# Patient Record
Sex: FEMALE | Race: BLACK OR AFRICAN AMERICAN | NOT HISPANIC OR LATINO | Employment: FULL TIME | URBAN - METROPOLITAN AREA
[De-identification: names, ages, dates, MRNs, and addresses within clinical notes are randomized per-mention and may not be internally consistent; named-entity substitution may affect disease eponyms.]

---

## 2015-01-15 LAB
BUN SERPL-MCNC: 22 MG/DL (ref 7–18)
CHOLEST SERPL-MCNC: 218 MG/DL
CREAT SERPL-MCNC: 1.3 MG/DL (ref 0.6–1.3)
GLUCOSE SERPL-MCNC: 105 MG/DL (ref 74–106)
HDLC SERPL-MCNC: 64 MG/DL (ref 35–60)
LDLC SERPL CALC-MCNC: 139 MG/DL
TRIGL SERPL-MCNC: 77 MG/DL (ref 30–150)

## 2017-01-06 ENCOUNTER — HISTORICAL (OUTPATIENT)
Dept: ADMINISTRATIVE | Facility: HOSPITAL | Age: 57
End: 2017-01-06

## 2017-02-10 ENCOUNTER — HISTORICAL (OUTPATIENT)
Dept: RADIOLOGY | Facility: HOSPITAL | Age: 57
End: 2017-02-10

## 2017-03-28 ENCOUNTER — HISTORICAL (OUTPATIENT)
Dept: LAB | Facility: HOSPITAL | Age: 57
End: 2017-03-28

## 2017-08-11 ENCOUNTER — HISTORICAL (OUTPATIENT)
Dept: RADIOLOGY | Facility: HOSPITAL | Age: 57
End: 2017-08-11

## 2017-11-13 ENCOUNTER — HISTORICAL (OUTPATIENT)
Dept: ADMINISTRATIVE | Facility: HOSPITAL | Age: 57
End: 2017-11-13

## 2017-11-13 LAB
ABS NEUT (OLG): 3.09 X10(3)/MCL (ref 2.1–9.2)
ALBUMIN SERPL-MCNC: 4.1 GM/DL (ref 3.4–5)
ALBUMIN/GLOB SERPL: 1.2 {RATIO}
ALP SERPL-CCNC: 112 UNIT/L (ref 38–126)
ALT SERPL-CCNC: 15 UNIT/L (ref 12–78)
AMYLASE SERPL-CCNC: 104 UNIT/L (ref 25–115)
AST SERPL-CCNC: 12 UNIT/L (ref 15–37)
BASOPHILS # BLD AUTO: 0 X10(3)/MCL (ref 0–0.2)
BASOPHILS NFR BLD AUTO: 0 %
BILIRUB SERPL-MCNC: 0.4 MG/DL (ref 0.2–1)
BILIRUBIN DIRECT+TOT PNL SERPL-MCNC: 0.1 MG/DL (ref 0–0.2)
BILIRUBIN DIRECT+TOT PNL SERPL-MCNC: 0.3 MG/DL (ref 0–0.8)
BUN SERPL-MCNC: 18 MG/DL (ref 7–18)
CALCIUM SERPL-MCNC: 9.2 MG/DL (ref 8.5–10.1)
CHLORIDE SERPL-SCNC: 102 MMOL/L (ref 98–107)
CHOLEST SERPL-MCNC: 234 MG/DL (ref 0–200)
CHOLEST/HDLC SERPL: 2.8 {RATIO} (ref 0–4)
CO2 SERPL-SCNC: 31 MMOL/L (ref 21–32)
CREAT SERPL-MCNC: 0.87 MG/DL (ref 0.55–1.02)
EOSINOPHIL # BLD AUTO: 0.2 X10(3)/MCL (ref 0–0.9)
EOSINOPHIL NFR BLD AUTO: 2 %
ERYTHROCYTE [DISTWIDTH] IN BLOOD BY AUTOMATED COUNT: 13.5 % (ref 11.5–17)
ERYTHROCYTE [SEDIMENTATION RATE] IN BLOOD: 13 MM/HR (ref 0–20)
EST. AVERAGE GLUCOSE BLD GHB EST-MCNC: 137 MG/DL
GLOBULIN SER-MCNC: 3.4 GM/DL (ref 2.4–3.5)
GLUCOSE SERPL-MCNC: 96 MG/DL (ref 74–106)
HBA1C MFR BLD: 6.4 % (ref 4.2–6.3)
HCT VFR BLD AUTO: 36.7 % (ref 37–47)
HDLC SERPL-MCNC: 85 MG/DL (ref 35–60)
HGB BLD-MCNC: 11.6 GM/DL (ref 12–16)
LDLC SERPL CALC-MCNC: 139 MG/DL (ref 0–129)
LIPASE SERPL-CCNC: 167 UNIT/L (ref 73–393)
LYMPHOCYTES # BLD AUTO: 3.2 X10(3)/MCL (ref 0.6–4.6)
LYMPHOCYTES NFR BLD AUTO: 45 %
MCH RBC QN AUTO: 29.5 PG (ref 27–31)
MCHC RBC AUTO-ENTMCNC: 31.6 GM/DL (ref 33–36)
MCV RBC AUTO: 93.4 FL (ref 80–94)
MONOCYTES # BLD AUTO: 0.6 X10(3)/MCL (ref 0.1–1.3)
MONOCYTES NFR BLD AUTO: 8 %
NEUTROPHILS # BLD AUTO: 3.09 X10(3)/MCL (ref 1.4–7.9)
NEUTROPHILS NFR BLD AUTO: 44 %
PLATELET # BLD AUTO: 244 X10(3)/MCL (ref 130–400)
PMV BLD AUTO: 9.8 FL (ref 9.4–12.4)
POTASSIUM SERPL-SCNC: 4 MMOL/L (ref 3.5–5.1)
PROT SERPL-MCNC: 7.5 GM/DL (ref 6.4–8.2)
RBC # BLD AUTO: 3.93 X10(6)/MCL (ref 4.2–5.4)
SODIUM SERPL-SCNC: 140 MMOL/L (ref 136–145)
T3FREE SERPL-MCNC: 2.86 PG/ML (ref 2.18–3.98)
T4 FREE SERPL-MCNC: 1.13 NG/DL (ref 0.76–1.46)
TRIGL SERPL-MCNC: 51 MG/DL (ref 30–150)
TSH SERPL-ACNC: 4.9 MIU/ML (ref 0.36–3.74)
VLDLC SERPL CALC-MCNC: 10 MG/DL
WBC # SPEC AUTO: 7.1 X10(3)/MCL (ref 4.5–11.5)

## 2017-11-17 ENCOUNTER — HISTORICAL (OUTPATIENT)
Dept: RADIOLOGY | Facility: HOSPITAL | Age: 57
End: 2017-11-17

## 2017-12-06 ENCOUNTER — HISTORICAL (OUTPATIENT)
Dept: LAB | Facility: HOSPITAL | Age: 57
End: 2017-12-06

## 2017-12-13 ENCOUNTER — HISTORICAL (OUTPATIENT)
Dept: ADMINISTRATIVE | Facility: HOSPITAL | Age: 57
End: 2017-12-13

## 2017-12-13 LAB
APPEARANCE, UA: CLEAR
BACTERIA SPEC CULT: ABNORMAL /HPF
BILIRUB UR QL STRIP: NEGATIVE
COLOR UR: YELLOW
DEPRECATED CALCIDIOL+CALCIFEROL SERPL-MC: 12.14 NG/ML (ref 30–80)
ERYTHROCYTE [SEDIMENTATION RATE] IN BLOOD: 15 MM/HR (ref 0–20)
GLUCOSE (UA): NEGATIVE
HGB UR QL STRIP: NEGATIVE
IRON SATN MFR SERPL: 20.6 % (ref 20–50)
IRON SERPL-MCNC: 74 MCG/DL (ref 50–175)
KETONES UR QL STRIP: NEGATIVE
LEUKOCYTE ESTERASE UR QL STRIP: NEGATIVE
NITRITE UR QL STRIP: NEGATIVE
PH UR STRIP: 5 [PH] (ref 5–9)
PROT UR QL STRIP: NEGATIVE
RBC #/AREA URNS HPF: ABNORMAL /[HPF]
SP GR UR STRIP: 1.01 (ref 1–1.03)
SQUAMOUS EPITHELIAL, UA: ABNORMAL
TIBC SERPL-MCNC: 359 MCG/DL (ref 250–450)
TRANSFERRIN SERPL-MCNC: 273 MG/DL (ref 200–360)
UROBILINOGEN UR STRIP-ACNC: 0.2
WBC #/AREA URNS HPF: ABNORMAL /[HPF]

## 2017-12-15 LAB — FINAL CULTURE: NORMAL

## 2017-12-27 ENCOUNTER — HISTORICAL (OUTPATIENT)
Dept: RADIOLOGY | Facility: HOSPITAL | Age: 57
End: 2017-12-27

## 2017-12-28 ENCOUNTER — HISTORICAL (OUTPATIENT)
Dept: RADIOLOGY | Facility: HOSPITAL | Age: 57
End: 2017-12-28

## 2017-12-29 ENCOUNTER — HISTORICAL (OUTPATIENT)
Dept: ADMINISTRATIVE | Facility: HOSPITAL | Age: 57
End: 2017-12-29

## 2017-12-29 LAB
EST. AVERAGE GLUCOSE BLD GHB EST-MCNC: 134 MG/DL
GLUCOSE P FAST SERPL-MCNC: 89 MG/DL (ref 70–115)
HBA1C MFR BLD: 6.3 % (ref 4.2–6.3)
TSH SERPL-ACNC: 1.49 MIU/ML (ref 0.36–3.74)

## 2018-01-19 LAB
INFLUENZA A ANTIGEN, POC: NEGATIVE
INFLUENZA B ANTIGEN, POC: NEGATIVE

## 2018-02-27 ENCOUNTER — HISTORICAL (OUTPATIENT)
Dept: ADMINISTRATIVE | Facility: HOSPITAL | Age: 58
End: 2018-02-27

## 2018-02-27 LAB
ABS NEUT (OLG): 3.99 X10(3)/MCL (ref 2.1–9.2)
APPEARANCE, UA: NORMAL
BACTERIA SPEC CULT: NORMAL /HPF
BASOPHILS # BLD AUTO: 0 X10(3)/MCL (ref 0–0.2)
BASOPHILS NFR BLD AUTO: 0 %
BILIRUB UR QL STRIP: NEGATIVE
BUN SERPL-MCNC: 17 MG/DL (ref 7–18)
CALCIUM SERPL-MCNC: 9.4 MG/DL (ref 8.5–10.1)
CHLORIDE SERPL-SCNC: 105 MMOL/L (ref 98–107)
CO2 SERPL-SCNC: 31 MMOL/L (ref 21–32)
COLOR UR: YELLOW
CREAT SERPL-MCNC: 1.01 MG/DL (ref 0.55–1.02)
CREAT/UREA NIT SERPL: 16.8
EOSINOPHIL # BLD AUTO: 0.1 X10(3)/MCL (ref 0–0.9)
EOSINOPHIL NFR BLD AUTO: 2 %
ERYTHROCYTE [DISTWIDTH] IN BLOOD BY AUTOMATED COUNT: 13.9 % (ref 11.5–17)
GLUCOSE (UA): NEGATIVE
GLUCOSE SERPL-MCNC: 75 MG/DL (ref 74–106)
HCT VFR BLD AUTO: 35.3 % (ref 37–47)
HGB BLD-MCNC: 11.6 GM/DL (ref 12–16)
HGB UR QL STRIP: NEGATIVE
KETONES UR QL STRIP: NEGATIVE
LEUKOCYTE ESTERASE UR QL STRIP: NEGATIVE
LYMPHOCYTES # BLD AUTO: 3.1 X10(3)/MCL (ref 0.6–4.6)
LYMPHOCYTES NFR BLD AUTO: 39 %
MCH RBC QN AUTO: 29.3 PG (ref 27–31)
MCHC RBC AUTO-ENTMCNC: 32.9 GM/DL (ref 33–36)
MCV RBC AUTO: 89.1 FL (ref 80–94)
MONOCYTES # BLD AUTO: 0.8 X10(3)/MCL (ref 0.1–1.3)
MONOCYTES NFR BLD AUTO: 10 %
NEUTROPHILS # BLD AUTO: 3.99 X10(3)/MCL (ref 2.1–9.2)
NEUTROPHILS NFR BLD AUTO: 50 %
NITRITE UR QL STRIP: NEGATIVE
PH UR STRIP: 7.5 [PH] (ref 5–9)
PLATELET # BLD AUTO: 237 X10(3)/MCL (ref 130–400)
PMV BLD AUTO: 9.5 FL (ref 9.4–12.4)
POTASSIUM SERPL-SCNC: 3.6 MMOL/L (ref 3.5–5.1)
PROT UR QL STRIP: NEGATIVE
RBC # BLD AUTO: 3.96 X10(6)/MCL (ref 4.2–5.4)
RBC #/AREA URNS HPF: NORMAL /[HPF]
SODIUM SERPL-SCNC: 141 MMOL/L (ref 136–145)
SP GR UR STRIP: 1.01 (ref 1–1.03)
SQUAMOUS EPITHELIAL, UA: NORMAL
UA WBC MAN: NORMAL
UROBILINOGEN UR STRIP-ACNC: 0.2
WBC # SPEC AUTO: 8.1 X10(3)/MCL (ref 4.5–11.5)

## 2018-03-14 ENCOUNTER — HISTORICAL (OUTPATIENT)
Dept: ANESTHESIOLOGY | Facility: HOSPITAL | Age: 58
End: 2018-03-14

## 2018-05-02 ENCOUNTER — HISTORICAL (OUTPATIENT)
Dept: ADMINISTRATIVE | Facility: HOSPITAL | Age: 58
End: 2018-05-02

## 2018-05-04 ENCOUNTER — HISTORICAL (OUTPATIENT)
Dept: ADMINISTRATIVE | Facility: HOSPITAL | Age: 58
End: 2018-05-04

## 2018-05-04 LAB
ABS NEUT (OLG): 3.54 X10(3)/MCL (ref 2.1–9.2)
ALBUMIN SERPL-MCNC: 4 GM/DL (ref 3.4–5)
ALBUMIN/GLOB SERPL: 1.2 RATIO (ref 1.1–2)
ALP SERPL-CCNC: 94 UNIT/L (ref 38–126)
ALT SERPL-CCNC: 17 UNIT/L (ref 12–78)
AMYLASE SERPL-CCNC: 107 UNIT/L (ref 25–115)
AST SERPL-CCNC: 11 UNIT/L (ref 15–37)
BASOPHILS # BLD AUTO: 0 X10(3)/MCL (ref 0–0.2)
BASOPHILS NFR BLD AUTO: 0 %
BILIRUB SERPL-MCNC: 0.5 MG/DL (ref 0.2–1)
BILIRUBIN DIRECT+TOT PNL SERPL-MCNC: 0.1 MG/DL (ref 0–0.5)
BILIRUBIN DIRECT+TOT PNL SERPL-MCNC: 0.4 MG/DL (ref 0–0.8)
BUN SERPL-MCNC: 23 MG/DL (ref 7–18)
CALCIUM SERPL-MCNC: 9.7 MG/DL (ref 8.5–10.1)
CHLORIDE SERPL-SCNC: 102 MMOL/L (ref 98–107)
CO2 SERPL-SCNC: 30 MMOL/L (ref 21–32)
CREAT SERPL-MCNC: 0.94 MG/DL (ref 0.55–1.02)
EOSINOPHIL # BLD AUTO: 0.1 X10(3)/MCL (ref 0–0.9)
EOSINOPHIL NFR BLD AUTO: 2 %
ERYTHROCYTE [DISTWIDTH] IN BLOOD BY AUTOMATED COUNT: 14.5 % (ref 11.5–17)
GLOBULIN SER-MCNC: 3.4 GM/DL (ref 2.4–3.5)
GLUCOSE SERPL-MCNC: 92 MG/DL (ref 74–106)
HCT VFR BLD AUTO: 36.2 % (ref 37–47)
HGB BLD-MCNC: 11.8 GM/DL (ref 12–16)
LIPASE SERPL-CCNC: 174 UNIT/L (ref 73–393)
LYMPHOCYTES # BLD AUTO: 4 X10(3)/MCL (ref 0.6–4.6)
LYMPHOCYTES NFR BLD AUTO: 47 %
MCH RBC QN AUTO: 29.9 PG (ref 27–31)
MCHC RBC AUTO-ENTMCNC: 32.6 GM/DL (ref 33–36)
MCV RBC AUTO: 91.9 FL (ref 80–94)
MONOCYTES # BLD AUTO: 0.8 X10(3)/MCL (ref 0.1–1.3)
MONOCYTES NFR BLD AUTO: 10 %
NEUTROPHILS # BLD AUTO: 3.54 X10(3)/MCL (ref 1.4–7.9)
NEUTROPHILS NFR BLD AUTO: 41 %
PLATELET # BLD AUTO: 265 X10(3)/MCL (ref 130–400)
PMV BLD AUTO: 9.7 FL (ref 9.4–12.4)
POTASSIUM SERPL-SCNC: 4 MMOL/L (ref 3.5–5.1)
PROT SERPL-MCNC: 7.4 GM/DL (ref 6.4–8.2)
RBC # BLD AUTO: 3.94 X10(6)/MCL (ref 4.2–5.4)
SODIUM SERPL-SCNC: 138 MMOL/L (ref 136–145)
WBC # SPEC AUTO: 8.6 X10(3)/MCL (ref 4.5–11.5)

## 2018-05-14 ENCOUNTER — HISTORICAL (OUTPATIENT)
Dept: ENDOSCOPY | Facility: HOSPITAL | Age: 58
End: 2018-05-14

## 2018-06-05 ENCOUNTER — HISTORICAL (OUTPATIENT)
Dept: RADIOLOGY | Facility: HOSPITAL | Age: 58
End: 2018-06-05

## 2018-06-05 LAB — POC CREATININE: 1.1 MG/DL (ref 0.6–1.3)

## 2018-08-09 ENCOUNTER — HISTORICAL (OUTPATIENT)
Dept: ADMINISTRATIVE | Facility: HOSPITAL | Age: 58
End: 2018-08-09

## 2018-08-09 LAB
DEPRECATED CALCIDIOL+CALCIFEROL SERPL-MC: 50 NG/ML (ref 30–80)
EST. AVERAGE GLUCOSE BLD GHB EST-MCNC: 134 MG/DL
GLUCOSE P FAST SERPL-MCNC: 96 MG/DL (ref 70–115)
HBA1C MFR BLD: 6.3 % (ref 4.2–6.3)
RHEUMATOID FACT SERPL-ACNC: <10 IU/ML (ref 0–15)
TSH SERPL-ACNC: 2.15 MIU/L (ref 0.36–3.74)

## 2018-08-20 ENCOUNTER — HISTORICAL (OUTPATIENT)
Dept: ADMINISTRATIVE | Facility: HOSPITAL | Age: 58
End: 2018-08-20

## 2018-08-20 LAB
APPEARANCE, UA: CLEAR
BACTERIA SPEC CULT: ABNORMAL /HPF
BILIRUB UR QL STRIP: NEGATIVE
COLOR UR: YELLOW
GLUCOSE (UA): NEGATIVE
HGB UR QL STRIP: NEGATIVE
KETONES UR QL STRIP: NEGATIVE
LEUKOCYTE ESTERASE UR QL STRIP: NEGATIVE
NITRITE UR QL STRIP: NEGATIVE
PH UR STRIP: 5 [PH] (ref 5–9)
PROT UR QL STRIP: NEGATIVE
RBC #/AREA URNS HPF: ABNORMAL /[HPF]
SP GR UR STRIP: 1.03 (ref 1–1.03)
SQUAMOUS EPITHELIAL, UA: ABNORMAL
UROBILINOGEN UR STRIP-ACNC: 1
WBC #/AREA URNS HPF: ABNORMAL /[HPF]

## 2018-08-22 LAB — FINAL CULTURE: NORMAL

## 2018-09-19 LAB
CRC RECOMMENDATION EXT: NORMAL
CRC RECOMMENDATION EXT: NORMAL

## 2018-12-04 ENCOUNTER — HISTORICAL (OUTPATIENT)
Dept: ANESTHESIOLOGY | Facility: HOSPITAL | Age: 58
End: 2018-12-04

## 2018-12-11 ENCOUNTER — HISTORICAL (OUTPATIENT)
Dept: ADMINISTRATIVE | Facility: HOSPITAL | Age: 58
End: 2018-12-11

## 2018-12-11 LAB
ABS NEUT (OLG): 2.61 X10(3)/MCL (ref 2.1–9.2)
ALBUMIN SERPL-MCNC: 3.9 GM/DL (ref 3.4–5)
ALBUMIN/GLOB SERPL: 1.1 RATIO (ref 1.1–2)
ALP SERPL-CCNC: 92 UNIT/L (ref 38–126)
ALT SERPL-CCNC: 16 UNIT/L (ref 12–78)
AMYLASE SERPL-CCNC: 101 UNIT/L (ref 25–115)
AST SERPL-CCNC: 16 UNIT/L (ref 15–37)
BASOPHILS # BLD AUTO: 0 X10(3)/MCL (ref 0–0.2)
BASOPHILS NFR BLD AUTO: 0 %
BILIRUB SERPL-MCNC: 0.5 MG/DL (ref 0.2–1)
BILIRUBIN DIRECT+TOT PNL SERPL-MCNC: 0.1 MG/DL (ref 0–0.5)
BILIRUBIN DIRECT+TOT PNL SERPL-MCNC: 0.4 MG/DL (ref 0–0.8)
BUN SERPL-MCNC: 14 MG/DL (ref 7–18)
CALCIUM SERPL-MCNC: 8.8 MG/DL (ref 8.5–10.1)
CHLORIDE SERPL-SCNC: 106 MMOL/L (ref 98–107)
CO2 SERPL-SCNC: 31 MMOL/L (ref 21–32)
CREAT SERPL-MCNC: 0.93 MG/DL (ref 0.55–1.02)
EOSINOPHIL # BLD AUTO: 0.1 X10(3)/MCL (ref 0–0.9)
EOSINOPHIL NFR BLD AUTO: 2 %
ERYTHROCYTE [DISTWIDTH] IN BLOOD BY AUTOMATED COUNT: 14.8 % (ref 11.5–17)
GLOBULIN SER-MCNC: 3.4 GM/DL (ref 2.4–3.5)
GLUCOSE SERPL-MCNC: 83 MG/DL (ref 74–106)
HCT VFR BLD AUTO: 38 % (ref 37–47)
HGB BLD-MCNC: 11.9 GM/DL (ref 12–16)
LIPASE SERPL-CCNC: 108 UNIT/L (ref 73–393)
LYMPHOCYTES # BLD AUTO: 2.4 X10(3)/MCL (ref 0.6–4.6)
LYMPHOCYTES NFR BLD AUTO: 43 %
MCH RBC QN AUTO: 28.9 PG (ref 27–31)
MCHC RBC AUTO-ENTMCNC: 31.3 GM/DL (ref 33–36)
MCV RBC AUTO: 92.2 FL (ref 80–94)
MONOCYTES # BLD AUTO: 0.5 X10(3)/MCL (ref 0.1–1.3)
MONOCYTES NFR BLD AUTO: 9 %
NEUTROPHILS # BLD AUTO: 2.61 X10(3)/MCL (ref 2.1–9.2)
NEUTROPHILS NFR BLD AUTO: 46 %
PLATELET # BLD AUTO: 253 X10(3)/MCL (ref 130–400)
PMV BLD AUTO: 9.6 FL (ref 9.4–12.4)
POTASSIUM SERPL-SCNC: 4 MMOL/L (ref 3.5–5.1)
PROT SERPL-MCNC: 7.3 GM/DL (ref 6.4–8.2)
RBC # BLD AUTO: 4.12 X10(6)/MCL (ref 4.2–5.4)
SODIUM SERPL-SCNC: 143 MMOL/L (ref 136–145)
WBC # SPEC AUTO: 5.7 X10(3)/MCL (ref 4.5–11.5)

## 2018-12-12 ENCOUNTER — HISTORICAL (OUTPATIENT)
Dept: LAB | Facility: HOSPITAL | Age: 58
End: 2018-12-12

## 2019-04-23 ENCOUNTER — HISTORICAL (OUTPATIENT)
Dept: ADMINISTRATIVE | Facility: HOSPITAL | Age: 59
End: 2019-04-23

## 2019-04-23 LAB
ABS NEUT (OLG): 3.06 X10(3)/MCL (ref 2.1–9.2)
ALBUMIN SERPL-MCNC: 3.8 GM/DL (ref 3.4–5)
ALBUMIN/GLOB SERPL: 1.2 RATIO (ref 1.1–2)
ALP SERPL-CCNC: 86 UNIT/L (ref 38–126)
ALT SERPL-CCNC: 16 UNIT/L (ref 12–78)
APPEARANCE, UA: CLEAR
AST SERPL-CCNC: 13 UNIT/L (ref 15–37)
BACTERIA SPEC CULT: NORMAL /HPF
BASOPHILS # BLD AUTO: 0 X10(3)/MCL (ref 0–0.2)
BASOPHILS NFR BLD AUTO: 0 %
BILIRUB SERPL-MCNC: 0.4 MG/DL (ref 0.2–1)
BILIRUB UR QL STRIP: NEGATIVE
BILIRUBIN DIRECT+TOT PNL SERPL-MCNC: 0.1 MG/DL (ref 0–0.5)
BILIRUBIN DIRECT+TOT PNL SERPL-MCNC: 0.3 MG/DL (ref 0–0.8)
BUN SERPL-MCNC: 16 MG/DL (ref 7–18)
CALCIUM SERPL-MCNC: 8.9 MG/DL (ref 8.5–10.1)
CHLORIDE SERPL-SCNC: 108 MMOL/L (ref 98–107)
CHOLEST SERPL-MCNC: 185 MG/DL (ref 0–200)
CHOLEST/HDLC SERPL: 2.9 {RATIO} (ref 0–4)
CO2 SERPL-SCNC: 27 MMOL/L (ref 21–32)
COLOR UR: YELLOW
CREAT SERPL-MCNC: 1.05 MG/DL (ref 0.55–1.02)
CREAT UR-MCNC: 159 MG/DL
EOSINOPHIL # BLD AUTO: 0.1 X10(3)/MCL (ref 0–0.9)
EOSINOPHIL NFR BLD AUTO: 2 %
ERYTHROCYTE [DISTWIDTH] IN BLOOD BY AUTOMATED COUNT: 14.8 % (ref 11.5–17)
EST. AVERAGE GLUCOSE BLD GHB EST-MCNC: 128 MG/DL
GLOBULIN SER-MCNC: 3.2 GM/DL (ref 2.4–3.5)
GLUCOSE (UA): NEGATIVE
GLUCOSE SERPL-MCNC: 86 MG/DL (ref 74–106)
HBA1C MFR BLD: 6.1 % (ref 4.2–6.3)
HCT VFR BLD AUTO: 33.4 % (ref 37–47)
HDLC SERPL-MCNC: 64 MG/DL (ref 35–60)
HGB BLD-MCNC: 10.6 GM/DL (ref 12–16)
HGB UR QL STRIP: NEGATIVE
KETONES UR QL STRIP: NEGATIVE
LDLC SERPL CALC-MCNC: 109 MG/DL (ref 0–129)
LEUKOCYTE ESTERASE UR QL STRIP: NEGATIVE
LYMPHOCYTES # BLD AUTO: 3.5 X10(3)/MCL (ref 0.6–4.6)
LYMPHOCYTES NFR BLD AUTO: 48 %
MCH RBC QN AUTO: 28.5 PG (ref 27–31)
MCHC RBC AUTO-ENTMCNC: 31.7 GM/DL (ref 33–36)
MCV RBC AUTO: 89.8 FL (ref 80–94)
MICROALBUMIN UR-MCNC: <0.5 MG/DL
MICROALBUMIN/CREAT RATIO PNL UR: <3.1 MG/GM CR (ref 0–30)
MONOCYTES # BLD AUTO: 0.6 X10(3)/MCL (ref 0.1–1.3)
MONOCYTES NFR BLD AUTO: 8 %
NEUTROPHILS # BLD AUTO: 3.06 X10(3)/MCL (ref 2.1–9.2)
NEUTROPHILS NFR BLD AUTO: 42 %
NITRITE UR QL STRIP: NEGATIVE
PH UR STRIP: 5.5 [PH] (ref 5–9)
PLATELET # BLD AUTO: 247 X10(3)/MCL (ref 130–400)
PMV BLD AUTO: 10.5 FL (ref 9.4–12.4)
POTASSIUM SERPL-SCNC: 3.7 MMOL/L (ref 3.5–5.1)
PROT SERPL-MCNC: 7 GM/DL (ref 6.4–8.2)
PROT UR QL STRIP: NEGATIVE
RBC # BLD AUTO: 3.72 X10(6)/MCL (ref 4.2–5.4)
RBC #/AREA URNS HPF: NORMAL /[HPF]
SODIUM SERPL-SCNC: 142 MMOL/L (ref 136–145)
SP GR UR STRIP: 1.02 (ref 1–1.03)
SQUAMOUS EPITHELIAL, UA: NORMAL
TRIGL SERPL-MCNC: 58 MG/DL (ref 30–150)
TSH SERPL-ACNC: 2.99 MIU/L (ref 0.36–3.74)
UROBILINOGEN UR STRIP-ACNC: 0.2
VLDLC SERPL CALC-MCNC: 12 MG/DL
WBC # SPEC AUTO: 7.4 X10(3)/MCL (ref 4.5–11.5)
WBC #/AREA URNS HPF: NORMAL /[HPF]

## 2019-05-16 ENCOUNTER — HISTORICAL (OUTPATIENT)
Dept: ADMINISTRATIVE | Facility: HOSPITAL | Age: 59
End: 2019-05-16

## 2019-05-16 LAB
DEPRECATED CALCIDIOL+CALCIFEROL SERPL-MC: 24.32 NG/ML (ref 30–80)
IRON SATN MFR SERPL: 19.8 % (ref 20–50)
IRON SERPL-MCNC: 64 MCG/DL (ref 50–175)
TIBC SERPL-MCNC: 324 MCG/DL (ref 250–450)
TRANSFERRIN SERPL-MCNC: 250 MG/DL (ref 200–360)
VIT B12 SERPL-MCNC: 644 PG/ML (ref 193–986)

## 2019-07-24 ENCOUNTER — HISTORICAL (OUTPATIENT)
Dept: ADMINISTRATIVE | Facility: HOSPITAL | Age: 59
End: 2019-07-24

## 2019-07-24 LAB
T4 FREE SERPL-MCNC: 0.82 NG/DL (ref 0.76–1.46)
TSH SERPL-ACNC: 4.99 MIU/L (ref 0.36–3.74)

## 2020-01-13 ENCOUNTER — HISTORICAL (OUTPATIENT)
Dept: ADMINISTRATIVE | Facility: HOSPITAL | Age: 60
End: 2020-01-13

## 2020-01-13 LAB
ABS NEUT (OLG): 2.08 X10(3)/MCL (ref 2.1–9.2)
ALBUMIN SERPL-MCNC: 4 GM/DL (ref 3.4–5)
ALBUMIN/GLOB SERPL: 1.2 {RATIO}
ALP SERPL-CCNC: 87 UNIT/L (ref 38–126)
ALT SERPL-CCNC: 17 UNIT/L (ref 12–78)
APPEARANCE, UA: CLEAR
AST SERPL-CCNC: 14 UNIT/L (ref 15–37)
BACTERIA SPEC CULT: NORMAL /HPF
BASOPHILS # BLD AUTO: 0 X10(3)/MCL (ref 0–0.2)
BASOPHILS NFR BLD AUTO: 0 %
BILIRUB SERPL-MCNC: 0.5 MG/DL (ref 0.2–1)
BILIRUB UR QL STRIP: NEGATIVE
BILIRUBIN DIRECT+TOT PNL SERPL-MCNC: 0.1 MG/DL (ref 0–0.2)
BILIRUBIN DIRECT+TOT PNL SERPL-MCNC: 0.4 MG/DL (ref 0–0.8)
BUN SERPL-MCNC: 15 MG/DL (ref 7–18)
CALCIUM SERPL-MCNC: 9.3 MG/DL (ref 8.5–10.1)
CHLORIDE SERPL-SCNC: 103 MMOL/L (ref 98–107)
CHOLEST SERPL-MCNC: 208 MG/DL (ref 0–200)
CHOLEST/HDLC SERPL: 2.5 {RATIO} (ref 0–4)
CO2 SERPL-SCNC: 32 MMOL/L (ref 21–32)
COLOR UR: YELLOW
CREAT SERPL-MCNC: 1.02 MG/DL (ref 0.55–1.02)
EOSINOPHIL # BLD AUTO: 0.1 X10(3)/MCL (ref 0–0.9)
EOSINOPHIL NFR BLD AUTO: 2 %
ERYTHROCYTE [DISTWIDTH] IN BLOOD BY AUTOMATED COUNT: 14.8 % (ref 11.5–17)
EST. AVERAGE GLUCOSE BLD GHB EST-MCNC: 131 MG/DL
GLOBULIN SER-MCNC: 3.2 GM/DL (ref 2.4–3.5)
GLUCOSE (UA): NEGATIVE
GLUCOSE SERPL-MCNC: 97 MG/DL (ref 74–106)
HBA1C MFR BLD: 6.2 % (ref 4.2–6.3)
HCT VFR BLD AUTO: 37.7 % (ref 37–47)
HDLC SERPL-MCNC: 83 MG/DL (ref 35–60)
HGB BLD-MCNC: 11.6 GM/DL (ref 12–16)
HGB UR QL STRIP: NEGATIVE
KETONES UR QL STRIP: NEGATIVE
LDLC SERPL CALC-MCNC: 116 MG/DL (ref 0–129)
LEUKOCYTE ESTERASE UR QL STRIP: NEGATIVE
LYMPHOCYTES # BLD AUTO: 2.9 X10(3)/MCL (ref 0.6–4.6)
LYMPHOCYTES NFR BLD AUTO: 52 %
MCH RBC QN AUTO: 28.6 PG (ref 27–31)
MCHC RBC AUTO-ENTMCNC: 30.8 GM/DL (ref 33–36)
MCV RBC AUTO: 93.1 FL (ref 80–94)
MONOCYTES # BLD AUTO: 0.5 X10(3)/MCL (ref 0.1–1.3)
MONOCYTES NFR BLD AUTO: 9 %
NEUTROPHILS # BLD AUTO: 2.08 X10(3)/MCL (ref 2.1–9.2)
NEUTROPHILS NFR BLD AUTO: 37 %
NITRITE UR QL STRIP: NEGATIVE
PH UR STRIP: 6.5 [PH] (ref 5–9)
PLATELET # BLD AUTO: 232 X10(3)/MCL (ref 130–400)
PMV BLD AUTO: 9.6 FL (ref 9.4–12.4)
POTASSIUM SERPL-SCNC: 4.1 MMOL/L (ref 3.5–5.1)
PROT SERPL-MCNC: 7.2 GM/DL (ref 6.4–8.2)
PROT UR QL STRIP: NEGATIVE
RBC # BLD AUTO: 4.05 X10(6)/MCL (ref 4.2–5.4)
RBC #/AREA URNS HPF: NORMAL /[HPF]
SODIUM SERPL-SCNC: 139 MMOL/L (ref 136–145)
SP GR UR STRIP: 1.01 (ref 1–1.03)
SQUAMOUS EPITHELIAL, UA: NORMAL
TRIGL SERPL-MCNC: 45 MG/DL (ref 30–150)
TSH SERPL-ACNC: 2.98 MIU/L (ref 0.36–3.74)
UROBILINOGEN UR STRIP-ACNC: 0.2
VLDLC SERPL CALC-MCNC: 9 MG/DL
WBC # SPEC AUTO: 5.7 X10(3)/MCL (ref 4.5–11.5)
WBC #/AREA URNS HPF: NORMAL /[HPF]

## 2020-05-07 ENCOUNTER — HISTORICAL (OUTPATIENT)
Dept: LAB | Facility: HOSPITAL | Age: 60
End: 2020-05-07

## 2020-08-15 ENCOUNTER — HISTORICAL (OUTPATIENT)
Dept: LAB | Facility: HOSPITAL | Age: 60
End: 2020-08-15

## 2020-08-15 LAB — SARS-COV-2 RNA RESP QL NAA+PROBE: NOT DETECTED

## 2020-08-31 ENCOUNTER — HISTORICAL (OUTPATIENT)
Dept: LAB | Facility: HOSPITAL | Age: 60
End: 2020-08-31

## 2020-08-31 LAB
ABS NEUT (OLG): 2.82 X10(3)/MCL (ref 2.1–9.2)
ALBUMIN SERPL-MCNC: 3.8 GM/DL (ref 3.4–5)
ALBUMIN/GLOB SERPL: 1 RATIO (ref 1.1–2)
ALP SERPL-CCNC: 101 UNIT/L (ref 45–117)
ALT SERPL-CCNC: 15 UNIT/L (ref 12–78)
AST SERPL-CCNC: 20 UNIT/L (ref 15–37)
BASOPHILS NFR BLD MANUAL: 0 %
BILIRUB SERPL-MCNC: 0.2 MG/DL (ref 0.2–1)
BILIRUBIN DIRECT+TOT PNL SERPL-MCNC: <0.1 MG/DL (ref 0–0.2)
BILIRUBIN DIRECT+TOT PNL SERPL-MCNC: ABNORMAL MG/DL
BUN SERPL-MCNC: 20 MG/DL (ref 7–18)
CALCIUM SERPL-MCNC: 9.2 MG/DL (ref 8.5–10.1)
CHLORIDE SERPL-SCNC: 107 MMOL/L (ref 98–107)
CHOLEST SERPL-MCNC: 204 MG/DL
CHOLEST/HDLC SERPL: 3 {RATIO} (ref 0–4.4)
CO2 SERPL-SCNC: 28 MMOL/L (ref 21–32)
CREAT SERPL-MCNC: 1 MG/DL (ref 0.6–1.3)
EOSINOPHIL NFR BLD MANUAL: 1 %
ERYTHROCYTE [DISTWIDTH] IN BLOOD BY AUTOMATED COUNT: 13.8 % (ref 11.5–14.5)
EST. AVERAGE GLUCOSE BLD GHB EST-MCNC: 140 MG/DL
GLOBULIN SER-MCNC: 3.8 GM/ML (ref 2.3–3.5)
GLUCOSE SERPL-MCNC: 92 MG/DL (ref 74–106)
GRANULOCYTES NFR BLD MANUAL: 34 % (ref 43–75)
HBA1C MFR BLD: 6.5 % (ref 4.2–6.3)
HCT VFR BLD AUTO: 35.1 % (ref 35–46)
HDLC SERPL-MCNC: 69 MG/DL (ref 40–59)
HGB BLD-MCNC: 11.3 GM/DL (ref 12–16)
LDLC SERPL CALC-MCNC: 122 MG/DL
LYMPHOCYTES NFR BLD MANUAL: 59 % (ref 20.5–51.1)
MCH RBC QN AUTO: 30.3 PG (ref 26–34)
MCHC RBC AUTO-ENTMCNC: 32.2 GM/DL (ref 31–37)
MCV RBC AUTO: 94.1 FL (ref 80–100)
MONOCYTES NFR BLD MANUAL: 5 % (ref 2–9)
NEUTS BAND NFR BLD MANUAL: 1 % (ref 0–10)
PLATELET # BLD AUTO: 215 X10(3)/MCL (ref 130–400)
PLATELET # BLD EST: ADEQUATE 10*3/UL
PMV BLD AUTO: 9.2 FL (ref 7.4–10.4)
POTASSIUM SERPL-SCNC: 3.6 MMOL/L (ref 3.5–5.1)
PROT SERPL-MCNC: 7.6 GM/DL (ref 6.4–8.2)
RBC # BLD AUTO: 3.73 X10(6)/MCL (ref 4–5.2)
RBC MORPH BLD: NORMAL
SODIUM SERPL-SCNC: 141 MMOL/L (ref 136–145)
TRIGL SERPL-MCNC: 63 MG/DL
TSH SERPL-ACNC: 3.57 MIU/L (ref 0.36–3.74)
VLDLC SERPL CALC-MCNC: 13 MG/DL
WBC # SPEC AUTO: 8.4 X10(3)/MCL (ref 4.5–11)

## 2020-12-14 ENCOUNTER — HISTORICAL (OUTPATIENT)
Dept: RADIOLOGY | Facility: HOSPITAL | Age: 60
End: 2020-12-14

## 2020-12-16 ENCOUNTER — HISTORICAL (OUTPATIENT)
Dept: LAB | Facility: HOSPITAL | Age: 60
End: 2020-12-16

## 2020-12-16 LAB
ABS NEUT (OLG): 2.97 X10(3)/MCL (ref 2.1–9.2)
BASOPHILS # BLD AUTO: 0.01 X10(3)/MCL (ref 0–0.2)
BASOPHILS NFR BLD AUTO: 0.1 % (ref 0–1)
BUN SERPL-MCNC: 19.1 MG/DL (ref 9.8–20.1)
CALCIUM SERPL-MCNC: 9.7 MG/DL (ref 8.4–10.2)
CHLORIDE SERPL-SCNC: 105 MMOL/L (ref 98–107)
CHOLEST SERPL-MCNC: 203 MG/DL
CHOLEST/HDLC SERPL: 3 {RATIO} (ref 0–5)
CO2 SERPL-SCNC: 29 MMOL/L (ref 22–29)
CREAT SERPL-MCNC: 0.96 MG/DL (ref 0.57–1.11)
CREAT/UREA NIT SERPL: 20
EOSINOPHIL # BLD AUTO: 0.14 X10(3)/MCL (ref 0–0.9)
EOSINOPHIL NFR BLD AUTO: 1.8 % (ref 0–6.4)
ERYTHROCYTE [DISTWIDTH] IN BLOOD BY AUTOMATED COUNT: 14.5 % (ref 11.5–17)
EST. AVERAGE GLUCOSE BLD GHB EST-MCNC: 119.8 MG/DL
GLUCOSE SERPL-MCNC: 102 MG/DL (ref 74–100)
HBA1C MFR BLD: 5.8 %
HCT VFR BLD AUTO: 34.2 % (ref 37–47)
HDLC SERPL-MCNC: 66 MG/DL (ref 40–60)
HGB BLD-MCNC: 11 GM/DL (ref 12–16)
IMM GRANULOCYTES # BLD AUTO: 0.01 10*3/UL (ref 0–0.02)
IMM GRANULOCYTES NFR BLD AUTO: 0.1 % (ref 0–0.43)
LDLC SERPL CALC-MCNC: 127 MG/DL (ref 50–140)
LYMPHOCYTES # BLD AUTO: 3.92 X10(3)/MCL (ref 0.6–4.6)
LYMPHOCYTES NFR BLD AUTO: 51.2 % (ref 16–44)
MCH RBC QN AUTO: 30 PG (ref 27–31)
MCHC RBC AUTO-ENTMCNC: 32.2 GM/DL (ref 33–36)
MCV RBC AUTO: 93.2 FL (ref 80–94)
MONOCYTES # BLD AUTO: 0.61 X10(3)/MCL (ref 0.1–1.3)
MONOCYTES NFR BLD AUTO: 8 % (ref 4–12.1)
NEUTROPHILS # BLD AUTO: 2.97 X10(3)/MCL (ref 2.1–9.2)
NEUTROPHILS NFR BLD AUTO: 38.8 % (ref 43–73)
NRBC BLD AUTO-RTO: 0 % (ref 0–0.2)
PLATELET # BLD AUTO: 218 X10(3)/MCL (ref 130–400)
PMV BLD AUTO: 9.8 FL (ref 7.4–10.4)
POTASSIUM SERPL-SCNC: 3.8 MMOL/L (ref 3.5–5.1)
RBC # BLD AUTO: 3.67 X10(6)/MCL (ref 4.2–5.4)
SODIUM SERPL-SCNC: 143 MMOL/L (ref 136–145)
TRIGL SERPL-MCNC: 48 MG/DL (ref 0–150)
VLDLC SERPL CALC-MCNC: 10 MG/DL
WBC # SPEC AUTO: 7.7 X10(3)/MCL (ref 4.5–11.5)

## 2021-03-09 ENCOUNTER — HISTORICAL (OUTPATIENT)
Dept: LAB | Facility: HOSPITAL | Age: 61
End: 2021-03-09

## 2021-03-09 LAB — SARS-COV-2 RNA RESP QL NAA+PROBE: DETECTED

## 2021-03-11 ENCOUNTER — HISTORICAL (OUTPATIENT)
Dept: INFECTIOUS DISEASES | Facility: HOSPITAL | Age: 61
End: 2021-03-11

## 2021-03-30 ENCOUNTER — HISTORICAL (OUTPATIENT)
Dept: ADMINISTRATIVE | Facility: HOSPITAL | Age: 61
End: 2021-03-30

## 2021-03-30 LAB
ABS NEUT (OLG): 2.11 X10(3)/MCL (ref 2.1–9.2)
ALBUMIN SERPL-MCNC: 3.7 GM/DL (ref 3.5–5)
ALBUMIN/GLOB SERPL: 1.4 RATIO (ref 1.1–2)
ALP SERPL-CCNC: 80 UNIT/L (ref 40–150)
ALT SERPL-CCNC: 9 UNIT/L (ref 0–55)
APPEARANCE, UA: CLEAR
AST SERPL-CCNC: 16 UNIT/L (ref 5–34)
BACTERIA SPEC CULT: ABNORMAL /HPF
BASOPHILS # BLD AUTO: 0 X10(3)/MCL (ref 0–0.2)
BASOPHILS NFR BLD AUTO: 0 %
BILIRUB SERPL-MCNC: 0.5 MG/DL
BILIRUB UR QL STRIP: NEGATIVE
BILIRUBIN DIRECT+TOT PNL SERPL-MCNC: 0.2 MG/DL (ref 0–0.5)
BILIRUBIN DIRECT+TOT PNL SERPL-MCNC: 0.3 MG/DL (ref 0–0.8)
BUN SERPL-MCNC: 20.9 MG/DL (ref 9.8–20.1)
CALCIUM SERPL-MCNC: 8.9 MG/DL (ref 8.4–10.2)
CHLORIDE SERPL-SCNC: 107 MMOL/L (ref 98–107)
CHOLEST SERPL-MCNC: 201 MG/DL
CHOLEST/HDLC SERPL: 3 {RATIO} (ref 0–5)
CO2 SERPL-SCNC: 29 MMOL/L (ref 22–29)
COLOR UR: YELLOW
CREAT SERPL-MCNC: 0.97 MG/DL (ref 0.55–1.02)
EOSINOPHIL # BLD AUTO: 0 X10(3)/MCL (ref 0–0.9)
EOSINOPHIL NFR BLD AUTO: 1 %
ERYTHROCYTE [DISTWIDTH] IN BLOOD BY AUTOMATED COUNT: 14.7 % (ref 11.5–17)
EST. AVERAGE GLUCOSE BLD GHB EST-MCNC: 122.6 MG/DL
FERRITIN SERPL-MCNC: 83.07 NG/ML (ref 4.63–204)
GLOBULIN SER-MCNC: 2.6 GM/DL (ref 2.4–3.5)
GLUCOSE (UA): NEGATIVE
GLUCOSE SERPL-MCNC: 88 MG/DL (ref 74–100)
HBA1C MFR BLD: 5.9 %
HCT VFR BLD AUTO: 33.3 % (ref 37–47)
HDLC SERPL-MCNC: 68 MG/DL (ref 35–60)
HGB BLD-MCNC: 10.7 GM/DL (ref 12–16)
HGB UR QL STRIP: NEGATIVE
IRON SATN MFR SERPL: 30 % (ref 20–50)
IRON SERPL-MCNC: 81 UG/DL (ref 50–170)
KETONES UR QL STRIP: ABNORMAL
LDLC SERPL CALC-MCNC: 122 MG/DL (ref 50–140)
LEUKOCYTE ESTERASE UR QL STRIP: ABNORMAL
LYMPHOCYTES # BLD AUTO: 2.1 X10(3)/MCL (ref 0.6–4.6)
LYMPHOCYTES NFR BLD AUTO: 44 %
MCH RBC QN AUTO: 29.7 PG (ref 27–31)
MCHC RBC AUTO-ENTMCNC: 32.1 GM/DL (ref 33–36)
MCV RBC AUTO: 92.5 FL (ref 80–94)
MONOCYTES # BLD AUTO: 0.5 X10(3)/MCL (ref 0.1–1.3)
MONOCYTES NFR BLD AUTO: 10 %
NEUTROPHILS # BLD AUTO: 2.11 X10(3)/MCL (ref 2.1–9.2)
NEUTROPHILS NFR BLD AUTO: 45 %
NITRITE UR QL STRIP: NEGATIVE
PH UR STRIP: 5 [PH] (ref 5–9)
PLATELET # BLD AUTO: 264 X10(3)/MCL (ref 130–400)
PMV BLD AUTO: 10.7 FL (ref 9.4–12.4)
POTASSIUM SERPL-SCNC: 4.4 MMOL/L (ref 3.5–5.1)
PROT SERPL-MCNC: 6.3 GM/DL (ref 6.4–8.3)
PROT UR QL STRIP: NEGATIVE
RBC # BLD AUTO: 3.6 X10(6)/MCL (ref 4.2–5.4)
RBC #/AREA URNS HPF: ABNORMAL /[HPF]
SODIUM SERPL-SCNC: 142 MMOL/L (ref 136–145)
SP GR UR STRIP: 1.02 (ref 1–1.03)
SQUAMOUS EPITHELIAL, UA: ABNORMAL /HPF (ref 0–4)
TIBC SERPL-MCNC: 187 UG/DL (ref 70–310)
TIBC SERPL-MCNC: 268 UG/DL (ref 250–450)
TRANSFERRIN SERPL-MCNC: 231 MG/DL (ref 180–382)
TRIGL SERPL-MCNC: 53 MG/DL (ref 37–140)
TSH SERPL-ACNC: 1.89 UIU/ML (ref 0.35–4.94)
UROBILINOGEN UR STRIP-ACNC: 0.2
VIT B12 SERPL-MCNC: >2000 PG/ML (ref 213–816)
VLDLC SERPL CALC-MCNC: 11 MG/DL
WBC # SPEC AUTO: 4.7 X10(3)/MCL (ref 4.5–11.5)
WBC #/AREA URNS HPF: 5 /HPF (ref 0–3)

## 2021-10-05 ENCOUNTER — HISTORICAL (OUTPATIENT)
Dept: LAB | Facility: HOSPITAL | Age: 61
End: 2021-10-05

## 2021-10-05 LAB
ABS NEUT (OLG): 3.45 X10(3)/MCL (ref 2.1–9.2)
ALBUMIN SERPL-MCNC: 4.5 GM/DL (ref 3.5–5)
ALBUMIN/GLOB SERPL: 1.3 RATIO (ref 1.1–2)
ALP SERPL-CCNC: 84 UNIT/L (ref 40–150)
ALT SERPL-CCNC: 11 UNIT/L (ref 0–55)
AST SERPL-CCNC: 20 UNIT/L (ref 5–34)
BASOPHILS # BLD AUTO: 0.03 X10(3)/MCL (ref 0–0.2)
BASOPHILS NFR BLD AUTO: 0.4 % (ref 0–1)
BILIRUB SERPL-MCNC: 0.4 MG/DL (ref 0.2–1.2)
BILIRUBIN DIRECT+TOT PNL SERPL-MCNC: 0.2 MG/DL (ref 0–0.5)
BILIRUBIN DIRECT+TOT PNL SERPL-MCNC: 0.2 MG/DL (ref 0–0.8)
BUN SERPL-MCNC: 19.6 MG/DL (ref 9.8–20.1)
CALCIUM SERPL-MCNC: 10 MG/DL (ref 8.4–10.2)
CHLORIDE SERPL-SCNC: 101 MMOL/L (ref 98–107)
CHOLEST SERPL-MCNC: 225 MG/DL
CHOLEST/HDLC SERPL: 3 {RATIO} (ref 0–5)
CO2 SERPL-SCNC: 30 MMOL/L (ref 22–29)
CREAT SERPL-MCNC: 1.05 MG/DL (ref 0.57–1.11)
EOSINOPHIL # BLD AUTO: 0.21 X10(3)/MCL (ref 0–0.9)
EOSINOPHIL NFR BLD AUTO: 2.7 % (ref 0–6.4)
ERYTHROCYTE [DISTWIDTH] IN BLOOD BY AUTOMATED COUNT: 14.4 % (ref 11.5–17)
EST. AVERAGE GLUCOSE BLD GHB EST-MCNC: 116.9 MG/DL
FERRITIN SERPL-MCNC: 79.37 NG/ML (ref 4.63–204)
GLOBULIN SER-MCNC: 3.4 GM/DL (ref 2.4–3.5)
GLUCOSE SERPL-MCNC: 91 MG/DL (ref 74–100)
HBA1C MFR BLD: 5.7 %
HCT VFR BLD AUTO: 37.5 % (ref 37–47)
HDLC SERPL-MCNC: 75 MG/DL (ref 40–60)
HGB BLD-MCNC: 12.2 GM/DL (ref 12–16)
IMM GRANULOCYTES # BLD AUTO: 0.01 10*3/UL (ref 0–0.02)
IMM GRANULOCYTES NFR BLD AUTO: 0.1 % (ref 0–0.43)
LDLC SERPL CALC-MCNC: 136 MG/DL (ref 50–140)
LYMPHOCYTES # BLD AUTO: 3.35 X10(3)/MCL (ref 0.6–4.6)
LYMPHOCYTES NFR BLD AUTO: 43.5 % (ref 16–44)
MCH RBC QN AUTO: 30 PG (ref 27–31)
MCHC RBC AUTO-ENTMCNC: 32.5 GM/DL (ref 33–36)
MCV RBC AUTO: 92.4 FL (ref 80–94)
MONOCYTES # BLD AUTO: 0.65 X10(3)/MCL (ref 0.1–1.3)
MONOCYTES NFR BLD AUTO: 8.4 % (ref 4–12.1)
NEUTROPHILS # BLD AUTO: 3.45 X10(3)/MCL (ref 2.1–9.2)
NEUTROPHILS NFR BLD AUTO: 44.9 % (ref 43–73)
NRBC BLD AUTO-RTO: 0 % (ref 0–0.2)
PLATELET # BLD AUTO: 243 X10(3)/MCL (ref 130–400)
PMV BLD AUTO: 9.5 FL (ref 7.4–10.4)
POTASSIUM SERPL-SCNC: 3.7 MMOL/L (ref 3.5–5.1)
PROT SERPL-MCNC: 7.9 GM/DL (ref 6.4–8.3)
RBC # BLD AUTO: 4.06 X10(6)/MCL (ref 4.2–5.4)
SODIUM SERPL-SCNC: 140 MMOL/L (ref 136–145)
TRIGL SERPL-MCNC: 69 MG/DL (ref 0–150)
TSH SERPL-ACNC: 1.92 UIU/ML (ref 0.35–4.94)
VLDLC SERPL CALC-MCNC: 14 MG/DL
WBC # SPEC AUTO: 7.7 X10(3)/MCL (ref 4.5–11.5)

## 2021-10-06 LAB
APPEARANCE, UA: CLEAR
BILIRUB UR QL STRIP: NEGATIVE
COLOR UR: YELLOW
CREAT UR-MCNC: 44.5 MG/DL (ref 45–106)
GLUCOSE (UA): NEGATIVE
HGB UR QL STRIP: NEGATIVE
KETONES UR QL STRIP: NEGATIVE
LEUKOCYTE ESTERASE UR QL STRIP: NEGATIVE
MICROALBUMIN UR-MCNC: <5 UG/ML
MICROALBUMIN/CREAT RATIO PNL UR: <11.2 MG/GM CR (ref 0–30)
NITRITE UR QL STRIP: NEGATIVE
PH UR STRIP: 6.5 [PH] (ref 5–7)
PROT UR QL STRIP: NEGATIVE
SP GR UR STRIP: 1.01 (ref 1–1.03)
UROBILINOGEN UR STRIP-ACNC: NEGATIVE

## 2021-10-27 LAB
PAP RECOMMENDATION EXT: NORMAL
PAP SMEAR: NORMAL

## 2022-04-10 ENCOUNTER — HISTORICAL (OUTPATIENT)
Dept: ADMINISTRATIVE | Facility: HOSPITAL | Age: 62
End: 2022-04-10
Payer: COMMERCIAL

## 2022-04-13 ENCOUNTER — HISTORICAL (OUTPATIENT)
Dept: RADIOLOGY | Facility: HOSPITAL | Age: 62
End: 2022-04-13
Payer: COMMERCIAL

## 2022-04-13 ENCOUNTER — HISTORICAL (OUTPATIENT)
Dept: ADMINISTRATIVE | Facility: HOSPITAL | Age: 62
End: 2022-04-13

## 2022-04-19 ENCOUNTER — HISTORICAL (OUTPATIENT)
Dept: ADMINISTRATIVE | Facility: HOSPITAL | Age: 62
End: 2022-04-19
Payer: COMMERCIAL

## 2022-04-19 LAB
ABS NEUT (OLG): 2.41 (ref 2.1–9.2)
ALBUMIN SERPL-MCNC: 4.1 G/DL (ref 3.4–4.8)
ALBUMIN/GLOB SERPL: 1.4 {RATIO} (ref 1.1–2)
ALP SERPL-CCNC: 70 U/L (ref 40–150)
ALT SERPL-CCNC: 17 U/L (ref 0–55)
APPEARANCE, UA: CLEAR
AST SERPL-CCNC: 24 U/L (ref 5–34)
BACTERIA SPEC CULT: NORMAL
BASOPHILS # BLD AUTO: 0 10*3/UL (ref 0–0.2)
BASOPHILS NFR BLD AUTO: 0 %
BILIRUB SERPL-MCNC: 0.5 MG/DL
BILIRUB UR QL STRIP: NEGATIVE
BILIRUBIN DIRECT+TOT PNL SERPL-MCNC: 0.2 (ref 0–0.5)
BILIRUBIN DIRECT+TOT PNL SERPL-MCNC: 0.3 (ref 0–0.8)
BUN SERPL-MCNC: 24.1 MG/DL (ref 9.8–20.1)
CALCIUM SERPL-MCNC: 9.5 MG/DL (ref 8.7–10.5)
CHLORIDE SERPL-SCNC: 103 MMOL/L (ref 98–107)
CHOLEST SERPL-MCNC: 235 MG/DL
CHOLEST/HDLC SERPL: 3 {RATIO} (ref 0–5)
CO2 SERPL-SCNC: 31 MMOL/L (ref 23–31)
COLOR UR: YELLOW
CREAT SERPL-MCNC: 1.06 MG/DL (ref 0.55–1.02)
EOSINOPHIL # BLD AUTO: 0.2 10*3/UL (ref 0–0.9)
EOSINOPHIL NFR BLD AUTO: 2 %
ERYTHROCYTE [DISTWIDTH] IN BLOOD BY AUTOMATED COUNT: 14.6 % (ref 11.5–17)
EST. AVERAGE GLUCOSE BLD GHB EST-MCNC: 122.6 MG/DL
FERRITIN SERPL-MCNC: 60.47 NG/ML (ref 4.63–204)
GLOBULIN SER-MCNC: 2.9 G/DL (ref 2.4–3.5)
GLUCOSE (UA): NEGATIVE
GLUCOSE SERPL-MCNC: 88 MG/DL (ref 82–115)
HBA1C MFR BLD: 5.9 %
HCT VFR BLD AUTO: 36.1 % (ref 37–47)
HDLC SERPL-MCNC: 74 MG/DL (ref 35–60)
HEMOLYSIS INTERF INDEX SERPL-ACNC: 0
HGB BLD-MCNC: 11.4 G/DL (ref 12–16)
HGB UR QL STRIP: NEGATIVE
ICTERIC INTERF INDEX SERPL-ACNC: 1
KETONES UR QL STRIP: NEGATIVE
LDLC SERPL CALC-MCNC: 151 MG/DL (ref 50–140)
LEUKOCYTE ESTERASE UR QL STRIP: NEGATIVE
LIPEMIC INTERF INDEX SERPL-ACNC: 2
LYMPHOCYTES # BLD AUTO: 3 10*3/UL (ref 0.6–4.6)
LYMPHOCYTES NFR BLD AUTO: 48 %
MANUAL DIFF? (OHS): NO
MCH RBC QN AUTO: 30.2 PG (ref 27–31)
MCHC RBC AUTO-ENTMCNC: 31.6 G/DL (ref 33–36)
MCV RBC AUTO: 95.5 FL (ref 80–94)
MONOCYTES # BLD AUTO: 0.6 10*3/UL (ref 0.1–1.3)
MONOCYTES NFR BLD AUTO: 10 %
NEUTROPHILS # BLD AUTO: 2.41 10*3/UL (ref 2.1–9.2)
NEUTROPHILS NFR BLD AUTO: 39 %
NITRITE UR QL STRIP: NEGATIVE
PH UR STRIP: 6 [PH] (ref 5–9)
PLATELET # BLD AUTO: 224 10*3/UL (ref 130–400)
PMV BLD AUTO: 11.8 FL (ref 9.4–12.4)
POTASSIUM SERPL-SCNC: 4 MMOL/L (ref 3.5–5.1)
PROT SERPL-MCNC: 7 G/DL (ref 5.8–7.6)
PROT UR QL STRIP: NEGATIVE
RBC # BLD AUTO: 3.78 10*6/UL (ref 4.2–5.4)
RBC #/AREA URNS HPF: NORMAL /[HPF] (ref 0–2)
SODIUM SERPL-SCNC: 142 MMOL/L (ref 136–145)
SP GR UR STRIP: 1.02 (ref 1–1.03)
SQUAMOUS EPITHELIAL, UA: NORMAL (ref 0–4)
TRIGL SERPL-MCNC: 49 MG/DL (ref 37–140)
TSH SERPL-ACNC: 1.84 M[IU]/L (ref 0.35–4.94)
UA WBC MAN: NORMAL (ref 0–2)
UROBILINOGEN UR STRIP-ACNC: 0.2
VIT B12 SERPL-MCNC: >2000 PG/ML (ref 213–816)
VLDLC SERPL CALC-MCNC: 10 MG/DL
WBC # SPEC AUTO: 6.2 10*3/UL (ref 4.5–11.5)

## 2022-04-26 DIAGNOSIS — Z00.00 GENERAL MEDICAL EXAM: ICD-10-CM

## 2022-04-26 DIAGNOSIS — Z00.00 ENCOUNTER FOR GENERAL ADULT MEDICAL EXAMINATION WITHOUT ABNORMAL FINDINGS: Primary | ICD-10-CM

## 2022-04-27 ENCOUNTER — HISTORICAL (OUTPATIENT)
Dept: RADIOLOGY | Facility: HOSPITAL | Age: 62
End: 2022-04-27
Payer: COMMERCIAL

## 2022-04-27 ENCOUNTER — HISTORICAL (OUTPATIENT)
Dept: ADMINISTRATIVE | Facility: HOSPITAL | Age: 62
End: 2022-04-27
Payer: COMMERCIAL

## 2022-04-27 VITALS
SYSTOLIC BLOOD PRESSURE: 126 MMHG | OXYGEN SATURATION: 99 % | DIASTOLIC BLOOD PRESSURE: 80 MMHG | HEIGHT: 65 IN | WEIGHT: 171 LBS | BODY MASS INDEX: 28.49 KG/M2

## 2022-04-30 NOTE — H&P
Patient:   Jenny Epps            MRN: [id number]            FIN: [id number]               Age:   58 years     Sex:  Female     :  1960   Associated Diagnoses:   None   Author:   Gregory Zazueta MD      Basic Information   Source of history:  Self.       History of Present Illness   Patient with a history of hepatitis C that was treated.  She had a previous upper endoscopy by Dr. Thomas with banding of esophageal varices with subsequent EGD she's got mild epigastric discomfort associated with occasional nausea no significant dysphagia or vomiting.  She was treated with her hepatitis C cleared virus with her vomiting she was previously tried on interferon and ribavirin could not tolerate.  She had F2 fibrosis on liver biopsy in fiber sure.      Review of Systems   Constitutional:  Negative.    Eye:  Negative.    Ear/Nose/Mouth/Throat:  Negative.    Respiratory:  Negative.    Cardiovascular:  Negative.    Gastrointestinal:  Heartburn.    Genitourinary:  Negative.    Hematology/Lymphatics:  Negative.    Endocrine:  Negative.    Immunologic:  Negative.    Musculoskeletal:  Negative.    Integumentary:  Negative.    Neurologic:  Negative.    Psychiatric:  Negative.       Health Status   Allergies:    Allergic Reactions (Selected)  Severity Not Documented  Acetaminophen-oxyCODONE- No reactions were documented.  Codeine- No reactions were documented.  Morphine- No reactions were documented.  Vistaril- No reactions were documented.,    Allergies (4) Active Reaction  acetaminophen-oxyCODONE None Documented  codeine None Documented  morphine None Documented  Vistaril None Documented     Current medications:  (Selected)   Prescriptions  Prescribed  Protonix 40 mg ORAL enteric coated tablet: 40 mg = 1 tab(s), Oral, Daily, PRN PRN control of stomach acid, # 30 tab(s), 2 Refill(s), Pharmacy: [id number]  Zofran ODT 8 mg oral tablet, disintegratin mg tabs, Oral, q6hr, PRN PRN  nausea/vomiting, # 15 tab(s), 0 Refill(s), Pharmacy: [facility]/pharmacy #[id number]  Documented Medications  Documented  AMLODIPINE BESYLATE 5 MG TAB: 5 mg = 1 tab(s), Oral, Daily  CIPROFLOXACIN  MG TAB: 500 mg = 1 tab(s), Oral, BID  LORAZEPAM 0.5 MG TABLET: 0.5 mg = 1 tab(s), Oral, Daily  METHYLPREDNISOLONE 4 MG DOSEPK:   OLMESARTAN-HCTZ 20-12.5 MG TAB: 1 tab(s), Oral, Daily  PROPRANOLOL 80 MG TABLET: 40 mg = 0.5 tab(s), Oral, Daily  SUCRALFATE 1 GM TABLET: gm tab(s), Oral  ZOLPIDEM TART ER 12.5 MG TAB: 12.5 mg = 1 tab(s), Oral, qPM  Zantac 150 oral tablet: 150 mg = 1 tab(s), Oral, Daily, 0 Refill(s)   Problem list:    All Problems  Foul smelling urine / SNOMED CT 18241420 / Confirmed  Anemia(  Confirmed  ) / SNOMED CT 565498387 / Confirmed  Depression / SNOMED CT 27598050 / Confirmed  Acid reflux(  Confirmed  ) / SNOMED CT 829707054 / Confirmed  Generalized anxiety disorder / SNOMED CT 89675999 / Confirmed  Hyperlipidemia(  Confirmed  ) / SNOMED CT 71219208 / Confirmed  Hypertension(  Confirmed  ) / SNOMED CT 3791256676 / Confirmed  Menopause(  Confirmed  ) / SNOMED CT 020441371 / Confirmed  Obesity(  Probable Diagnosis  ) / SNOMED CT 0859343293 / Confirmed  Palpitations / SNOMED CT 311456785 / Confirmed  Wellness examination / SNOMED CT 772968002 / Confirmed  Hepatitis C(  Confirmed  ) / SNOMED CT 47533742 / Confirmed  Vitamin D deficiency(  Confirmed  ) / SNOMED CT 48027626 / Confirmed,    Active Problems (13)  Acid reflux(  Confirmed  )   Anemia(  Confirmed  )   Depression   Foul smelling urine   Generalized anxiety disorder   Hepatitis C(  Confirmed  )   Hyperlipidemia(  Confirmed  )   Hypertension(  Confirmed  )   Menopause(  Confirmed  )   Obesity(  Probable Diagnosis  )   Palpitations   Vitamin D deficiency(  Confirmed  )   Wellness examination         Histories   Past Medical History:    No active or resolved past medical history items have been selected or recorded.   Family  History:    Patient was adopted.    Procedure history:    Mammogram (774222662) on 2016 at 56 Years.  Papanicolaou smear taken (935642462) on 2016 at 56 Years.   section (30565956) in  at 37 Years.  Discectomy (5157716) in  at 28 Years.  Fusion of lumbar spine (735500274) in  at 28 Years.  Comments:  5/10/2017 15:56 - Lindsay Carrillo  L4-5 FUSION PER DR ATUL BARNES   delivery ([id number]) in  at 21 Years.  Comments:  2015 13:15 - Jayde Campos LPN  X 2; 2nd in    Social History        Social & Psychosocial Habits    Alcohol  2015  Use: Never    Employment/School  2015  Status: Employed    Description:     Work hazards: Medical/Clinical work    Exercise  2015 Risk Assessment: Does not exercise      Comment: no current program - 2016 08:12 - Lakshmi Kraus LPN    Home/Environment  2015  Lives with: Children    Nutrition/Health  2015  Type of diet: Regular    Substance Abuse  2015  Use: Never    Tobacco  2015  Use: Never smoker  .        Physical Examination   General:  Alert and oriented.    Respiratory:  Lungs are clear to auscultation.    Gastrointestinal:  Soft, Non-tender.       No qualifying data available      Health Maintenance      Health Maintenance     Pending (in the next year)        OverDue           Aspirin Therapy for CVD Prevention due  16  and every 1  year(s)           Colorectal Screening due  17  and every 1  year(s)           Breast Cancer Screening due  17  and every 1  year(s)           Hypertension Management-Education due  05/10/18  and every 1  year(s)        Postponed            Tetanus Vaccine due  18  and every 10  year(s)        Due In Future            Depression Screening not due until  18  and every 1  year(s)           Hypertension Management-Blood Pressure not due until  18  and every 6  month(s)           Influenza Vaccine not due until   10/02/18  and every 1  year(s)           Lipid Screening not due until  11/13/18  and every 1  year(s)           Blood Pressure Screening not due until  01/19/19  and every 1  year(s)           Body Mass Index Check not due until  01/19/19  and every 1  year(s)           Obesity Screening not due until  01/19/19  and every 1  year(s)           Hypertension Management-BMP not due until  05/04/19  and every 1  year(s)     Satisfied (in the past 1 year)        Satisfied            Blood Pressure Screening on  01/19/18.  Satisfied by Maria R Moura.           Body Mass Index Check on  01/19/18.  Satisfied by Maria R Moura H.           Cervical Cancer Screening on  05/16/17.  Satisfied by Agnes Bond           Depression Screening on  07/12/17.  Satisfied by Gabrielle Lozano.           Diabetes Screening on  05/04/18.  Satisfied by Quin Mancilla MT           Hypertension Management-BMP on  05/04/18.  Satisfied by Quin Mancilla MT           Hypertension Management-Blood Pressure on  01/19/18.  Satisfied by Maria R Moura H.           Lipid Screening on  11/13/17.  Satisfied by Felipe Spear           Obesity Screening on  01/19/18.  Satisfied by Maria R Moura H.        Postponed            Tetanus Vaccine until  07/12/17.  Recorded for Gabrielle Lozano          Review / Management   Results review:     No qualifying data available.       Impression and Plan   Plan for EGD to rule out varices

## 2022-05-02 ENCOUNTER — TELEPHONE (OUTPATIENT)
Dept: INTERNAL MEDICINE | Facility: CLINIC | Age: 62
End: 2022-05-02
Payer: COMMERCIAL

## 2022-05-05 RX ORDER — CYCLOBENZAPRINE HCL 10 MG
TABLET ORAL
COMMUNITY
Start: 2022-03-31 | End: 2022-05-05 | Stop reason: SDUPTHER

## 2022-05-05 RX ORDER — CYCLOBENZAPRINE HCL 10 MG
10 TABLET ORAL 3 TIMES DAILY
Qty: 30 TABLET | Refills: 0 | Status: SHIPPED | OUTPATIENT
Start: 2022-05-05 | End: 2022-05-15

## 2022-05-05 RX ORDER — KETOROLAC TROMETHAMINE 10 MG/1
10 TABLET, FILM COATED ORAL 2 TIMES DAILY
Qty: 10 TABLET | Refills: 0 | Status: SHIPPED | OUTPATIENT
Start: 2022-05-05 | End: 2022-05-10

## 2022-05-05 RX ORDER — KETOROLAC TROMETHAMINE 10 MG/1
TABLET, FILM COATED ORAL
COMMUNITY
Start: 2022-03-31 | End: 2022-05-05 | Stop reason: SDUPTHER

## 2022-05-05 NOTE — TELEPHONE ENCOUNTER
Patient is requesting a refill on toradol and flexeril that fabiana gave her. Ok to fill?    [facility] mcintosh [phone]

## 2022-05-09 ENCOUNTER — HOSPITAL ENCOUNTER (OUTPATIENT)
Dept: RADIOLOGY | Facility: HOSPITAL | Age: 62
Discharge: HOME | End: 2022-05-09
Attending: INTERNAL MEDICINE
Payer: COMMERCIAL

## 2022-05-09 DIAGNOSIS — Z00.00 GENERAL MEDICAL EXAM: ICD-10-CM

## 2022-05-09 DIAGNOSIS — Z00.00 ENCOUNTER FOR GENERAL ADULT MEDICAL EXAMINATION WITHOUT ABNORMAL FINDINGS: ICD-10-CM

## 2022-05-09 PROCEDURE — 75571 CT HRT W/O DYE W/CA TEST: CPT | Mod: TC

## 2022-05-10 ENCOUNTER — TELEPHONE (OUTPATIENT)
Dept: INTERNAL MEDICINE | Facility: CLINIC | Age: 62
End: 2022-05-10
Payer: COMMERCIAL

## 2022-05-10 DIAGNOSIS — M79.606 PAIN OF LOWER EXTREMITY, UNSPECIFIED LATERALITY: Primary | ICD-10-CM

## 2022-05-10 NOTE — TELEPHONE ENCOUNTER
----- Message from Tricia Harrison sent at 5/10/2022  2:48 PM CDT -----  Regarding: ref  Pt is req ref neurologist, pain down her leg.   [phone]

## 2022-05-12 ENCOUNTER — TELEPHONE (OUTPATIENT)
Dept: INTERNAL MEDICINE | Facility: CLINIC | Age: 62
End: 2022-05-12
Payer: COMMERCIAL

## 2022-05-12 NOTE — TELEPHONE ENCOUNTER
Having issues with Epic. Radiologists are backed up. Should be in by Tomorrow. I spoke with patient and explained this to her.

## 2022-05-16 NOTE — PROGRESS NOTES
Please call her with calcium score result.  She had a calcium score of 2, which is very low.  No changes to medications

## 2022-09-17 ENCOUNTER — HISTORICAL (OUTPATIENT)
Dept: ADMINISTRATIVE | Facility: HOSPITAL | Age: 62
End: 2022-09-17
Payer: COMMERCIAL

## 2022-10-11 DIAGNOSIS — D64.9 ANEMIA, UNSPECIFIED TYPE: ICD-10-CM

## 2022-10-11 DIAGNOSIS — D50.9 IRON DEFICIENCY ANEMIA, UNSPECIFIED IRON DEFICIENCY ANEMIA TYPE: ICD-10-CM

## 2022-10-11 DIAGNOSIS — R73.01 IFG (IMPAIRED FASTING GLUCOSE): ICD-10-CM

## 2022-10-11 DIAGNOSIS — E55.9 VITAMIN D DEFICIENCY: ICD-10-CM

## 2022-10-11 DIAGNOSIS — E78.5 HYPERLIPIDEMIA, UNSPECIFIED HYPERLIPIDEMIA TYPE: ICD-10-CM

## 2022-10-11 DIAGNOSIS — I10 HYPERTENSION, UNSPECIFIED TYPE: Primary | ICD-10-CM

## 2022-10-17 ENCOUNTER — LAB VISIT (OUTPATIENT)
Dept: LAB | Facility: HOSPITAL | Age: 62
End: 2022-10-17
Attending: INTERNAL MEDICINE
Payer: COMMERCIAL

## 2022-10-17 DIAGNOSIS — D50.9 IRON DEFICIENCY ANEMIA, UNSPECIFIED IRON DEFICIENCY ANEMIA TYPE: ICD-10-CM

## 2022-10-17 DIAGNOSIS — E55.9 VITAMIN D DEFICIENCY: ICD-10-CM

## 2022-10-17 DIAGNOSIS — I10 HYPERTENSION, UNSPECIFIED TYPE: ICD-10-CM

## 2022-10-17 DIAGNOSIS — E78.5 HYPERLIPIDEMIA, UNSPECIFIED HYPERLIPIDEMIA TYPE: ICD-10-CM

## 2022-10-17 DIAGNOSIS — R73.01 IFG (IMPAIRED FASTING GLUCOSE): ICD-10-CM

## 2022-10-17 DIAGNOSIS — D64.9 ANEMIA, UNSPECIFIED TYPE: ICD-10-CM

## 2022-10-17 LAB
ALBUMIN SERPL-MCNC: 4 GM/DL (ref 3.4–4.8)
ALBUMIN/GLOB SERPL: 1.3 RATIO (ref 1.1–2)
ALP SERPL-CCNC: 86 UNIT/L (ref 40–150)
ALT SERPL-CCNC: 13 UNIT/L (ref 0–55)
APPEARANCE UR: CLEAR
AST SERPL-CCNC: 21 UNIT/L (ref 5–34)
BACTERIA #/AREA URNS AUTO: NORMAL /HPF
BASOPHILS # BLD AUTO: 0.03 X10(3)/MCL (ref 0–0.2)
BASOPHILS NFR BLD AUTO: 0.4 %
BILIRUB UR QL STRIP.AUTO: NEGATIVE MG/DL
BILIRUBIN DIRECT+TOT PNL SERPL-MCNC: 0.3 MG/DL
BUN SERPL-MCNC: 16.7 MG/DL (ref 9.8–20.1)
CALCIUM SERPL-MCNC: 9.7 MG/DL (ref 8.4–10.2)
CHLORIDE SERPL-SCNC: 103 MMOL/L (ref 98–107)
CHOLEST SERPL-MCNC: 220 MG/DL
CHOLEST/HDLC SERPL: 3 {RATIO} (ref 0–5)
CO2 SERPL-SCNC: 28 MMOL/L (ref 23–31)
COLOR UR AUTO: ABNORMAL
CREAT SERPL-MCNC: 1.15 MG/DL (ref 0.55–1.02)
EOSINOPHIL # BLD AUTO: 0.25 X10(3)/MCL (ref 0–0.9)
EOSINOPHIL NFR BLD AUTO: 3.4 %
ERYTHROCYTE [DISTWIDTH] IN BLOOD BY AUTOMATED COUNT: 14.8 % (ref 11.5–17)
EST. AVERAGE GLUCOSE BLD GHB EST-MCNC: 114 MG/DL
FERRITIN SERPL-MCNC: 33.42 NG/ML (ref 4.63–204)
GFR SERPLBLD CREATININE-BSD FMLA CKD-EPI: 55 MLS/MIN/1.73/M2
GLOBULIN SER-MCNC: 3 GM/DL (ref 2.4–3.5)
GLUCOSE SERPL-MCNC: 92 MG/DL (ref 82–115)
GLUCOSE UR QL STRIP.AUTO: NEGATIVE MG/DL
HBA1C MFR BLD: 5.6 %
HCT VFR BLD AUTO: 35.7 % (ref 37–47)
HDLC SERPL-MCNC: 67 MG/DL (ref 35–60)
HGB BLD-MCNC: 11.1 GM/DL (ref 12–16)
IMM GRANULOCYTES # BLD AUTO: 0.01 X10(3)/MCL (ref 0–0.04)
IMM GRANULOCYTES NFR BLD AUTO: 0.1 %
KETONES UR QL STRIP.AUTO: ABNORMAL MG/DL
LDLC SERPL CALC-MCNC: 140 MG/DL (ref 50–140)
LEUKOCYTE ESTERASE UR QL STRIP.AUTO: NEGATIVE UNIT/L
LYMPHOCYTES # BLD AUTO: 3.6 X10(3)/MCL (ref 0.6–4.6)
LYMPHOCYTES NFR BLD AUTO: 48.5 %
MCH RBC QN AUTO: 29.5 PG (ref 27–31)
MCHC RBC AUTO-ENTMCNC: 31.1 MG/DL (ref 33–36)
MCV RBC AUTO: 94.9 FL (ref 80–94)
MONOCYTES # BLD AUTO: 0.7 X10(3)/MCL (ref 0.1–1.3)
MONOCYTES NFR BLD AUTO: 9.4 %
NEUTROPHILS # BLD AUTO: 2.8 X10(3)/MCL (ref 2.1–9.2)
NEUTROPHILS NFR BLD AUTO: 38.2 %
NITRITE UR QL STRIP.AUTO: NEGATIVE
NRBC BLD AUTO-RTO: 0 %
PH UR STRIP.AUTO: 6 [PH]
PLATELET # BLD AUTO: 256 X10(3)/MCL (ref 130–400)
PMV BLD AUTO: 11 FL (ref 7.4–10.4)
POTASSIUM SERPL-SCNC: 4 MMOL/L (ref 3.5–5.1)
PROT SERPL-MCNC: 7 GM/DL (ref 5.8–7.6)
PROT UR QL STRIP.AUTO: NEGATIVE MG/DL
RBC # BLD AUTO: 3.76 X10(6)/MCL (ref 4.2–5.4)
RBC #/AREA URNS AUTO: <5 /HPF
RBC UR QL AUTO: NEGATIVE UNIT/L
SODIUM SERPL-SCNC: 139 MMOL/L (ref 136–145)
SP GR UR STRIP.AUTO: 1.02 (ref 1–1.03)
SQUAMOUS #/AREA URNS AUTO: <5 /HPF
TRIGL SERPL-MCNC: 67 MG/DL (ref 37–140)
TSH SERPL-ACNC: 2.38 UIU/ML (ref 0.35–4.94)
UROBILINOGEN UR STRIP-ACNC: 1 MG/DL
VIT B12 SERPL-MCNC: 1777 PG/ML (ref 213–816)
VLDLC SERPL CALC-MCNC: 13 MG/DL
WBC # SPEC AUTO: 7.4 X10(3)/MCL (ref 4.5–11.5)
WBC #/AREA URNS AUTO: <5 /HPF

## 2022-10-17 PROCEDURE — 82728 ASSAY OF FERRITIN: CPT

## 2022-10-17 PROCEDURE — 85025 COMPLETE CBC W/AUTO DIFF WBC: CPT

## 2022-10-17 PROCEDURE — 81001 URINALYSIS AUTO W/SCOPE: CPT

## 2022-10-17 PROCEDURE — 83036 HEMOGLOBIN GLYCOSYLATED A1C: CPT

## 2022-10-17 PROCEDURE — 80053 COMPREHEN METABOLIC PANEL: CPT

## 2022-10-17 PROCEDURE — 84443 ASSAY THYROID STIM HORMONE: CPT

## 2022-10-17 PROCEDURE — 82607 VITAMIN B-12: CPT

## 2022-10-17 PROCEDURE — 36415 COLL VENOUS BLD VENIPUNCTURE: CPT

## 2022-10-17 PROCEDURE — 80061 LIPID PANEL: CPT

## 2022-10-20 PROBLEM — Z00.00 WELLNESS EXAMINATION: Status: ACTIVE | Noted: 2022-10-20

## 2022-10-20 PROBLEM — D63.8 ANEMIA IN OTHER CHRONIC DISEASES CLASSIFIED ELSEWHERE: Status: ACTIVE | Noted: 2022-10-20

## 2022-10-20 PROBLEM — N18.31 CHRONIC KIDNEY DISEASE, STAGE 3A: Status: ACTIVE | Noted: 2022-10-20

## 2022-10-20 PROBLEM — E11.9 TYPE 2 DIABETES MELLITUS WITHOUT COMPLICATION, WITHOUT LONG-TERM CURRENT USE OF INSULIN: Status: ACTIVE | Noted: 2022-10-20

## 2022-10-20 PROBLEM — I10 PRIMARY HYPERTENSION: Status: ACTIVE | Noted: 2022-10-20

## 2022-10-20 PROBLEM — R73.01 IMPAIRED FASTING GLUCOSE: Status: ACTIVE | Noted: 2022-10-20

## 2022-10-20 PROBLEM — I20.89 OTHER FORMS OF ANGINA PECTORIS: Status: ACTIVE | Noted: 2022-10-20

## 2022-10-20 PROBLEM — E78.5 DYSLIPIDEMIA: Status: ACTIVE | Noted: 2022-10-20
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
## 2022-10-27 ENCOUNTER — TELEPHONE (OUTPATIENT)
Dept: INTERNAL MEDICINE | Facility: CLINIC | Age: 62
End: 2022-10-27

## 2022-10-27 ENCOUNTER — OFFICE VISIT (OUTPATIENT)
Dept: INTERNAL MEDICINE | Facility: CLINIC | Age: 62
End: 2022-10-27
Payer: COMMERCIAL

## 2022-10-27 ENCOUNTER — TELEPHONE (OUTPATIENT)
Dept: INTERNAL MEDICINE | Facility: CLINIC | Age: 62
End: 2022-10-27
Payer: COMMERCIAL

## 2022-10-27 VITALS
HEIGHT: 65 IN | RESPIRATION RATE: 18 BRPM | OXYGEN SATURATION: 99 % | HEART RATE: 62 BPM | BODY MASS INDEX: 33.32 KG/M2 | DIASTOLIC BLOOD PRESSURE: 78 MMHG | WEIGHT: 200 LBS | SYSTOLIC BLOOD PRESSURE: 116 MMHG

## 2022-10-27 DIAGNOSIS — F32.A ANXIETY AND DEPRESSION: ICD-10-CM

## 2022-10-27 DIAGNOSIS — F41.9 ANXIETY AND DEPRESSION: ICD-10-CM

## 2022-10-27 DIAGNOSIS — N18.31 CHRONIC KIDNEY DISEASE, STAGE 3A: ICD-10-CM

## 2022-10-27 DIAGNOSIS — Z87.828 HISTORY OF MOTOR VEHICLE ACCIDENT: ICD-10-CM

## 2022-10-27 DIAGNOSIS — I20.89 OTHER FORMS OF ANGINA PECTORIS: ICD-10-CM

## 2022-10-27 DIAGNOSIS — E11.9 TYPE 2 DIABETES MELLITUS WITHOUT COMPLICATION, WITHOUT LONG-TERM CURRENT USE OF INSULIN: ICD-10-CM

## 2022-10-27 DIAGNOSIS — R74.8 ELEVATED VITAMIN B12 LEVEL: ICD-10-CM

## 2022-10-27 DIAGNOSIS — R63.5 WEIGHT GAIN: Primary | ICD-10-CM

## 2022-10-27 DIAGNOSIS — I83.813 VARICOSE VEINS OF BOTH LOWER EXTREMITIES WITH PAIN: ICD-10-CM

## 2022-10-27 PROCEDURE — 1159F MED LIST DOCD IN RCRD: CPT | Mod: CPTII,,, | Performed by: NURSE PRACTITIONER

## 2022-10-27 PROCEDURE — 1159F PR MEDICATION LIST DOCUMENTED IN MEDICAL RECORD: ICD-10-PCS | Mod: CPTII,,, | Performed by: NURSE PRACTITIONER

## 2022-10-27 PROCEDURE — 3074F PR MOST RECENT SYSTOLIC BLOOD PRESSURE < 130 MM HG: ICD-10-PCS | Mod: CPTII,,, | Performed by: NURSE PRACTITIONER

## 2022-10-27 PROCEDURE — 99214 PR OFFICE/OUTPT VISIT, EST, LEVL IV, 30-39 MIN: ICD-10-PCS | Mod: ,,, | Performed by: NURSE PRACTITIONER

## 2022-10-27 PROCEDURE — 1160F RVW MEDS BY RX/DR IN RCRD: CPT | Mod: CPTII,,, | Performed by: NURSE PRACTITIONER

## 2022-10-27 PROCEDURE — 1160F PR REVIEW ALL MEDS BY PRESCRIBER/CLIN PHARMACIST DOCUMENTED: ICD-10-PCS | Mod: CPTII,,, | Performed by: NURSE PRACTITIONER

## 2022-10-27 PROCEDURE — 3074F SYST BP LT 130 MM HG: CPT | Mod: CPTII,,, | Performed by: NURSE PRACTITIONER

## 2022-10-27 PROCEDURE — 3078F DIAST BP <80 MM HG: CPT | Mod: CPTII,,, | Performed by: NURSE PRACTITIONER

## 2022-10-27 PROCEDURE — 99214 OFFICE O/P EST MOD 30 MIN: CPT | Mod: ,,, | Performed by: NURSE PRACTITIONER

## 2022-10-27 PROCEDURE — 3078F PR MOST RECENT DIASTOLIC BLOOD PRESSURE < 80 MM HG: ICD-10-PCS | Mod: CPTII,,, | Performed by: NURSE PRACTITIONER

## 2022-10-27 RX ORDER — ESCITALOPRAM OXALATE 5 MG/1
5 TABLET ORAL DAILY
Qty: 90 TABLET | Refills: 4 | Status: SHIPPED | OUTPATIENT
Start: 2022-10-27 | End: 2023-02-15 | Stop reason: DRUGHIGH

## 2022-10-27 NOTE — TELEPHONE ENCOUNTER
----- Message from Yu Trotter sent at 10/27/2022  4:20 PM CDT -----  [phone]  Ms Alvarado had an appt with Rebeca today and was given a visit summary. She has a question regarding one of the diagnosis on the summary, Stage 3 Chronic Kidney Disease  Asked if you can please call her

## 2022-10-27 NOTE — TELEPHONE ENCOUNTER
----- Message from Yu Trotter sent at 10/27/2022  4:20 PM CDT -----  [phone]  Ms Alvarado had an appt with Rebeca today and was given a visit summary. She has a question regarding one of the diagnosis on the summary, Stage 3 Chronic Kidney Disease  Asked if you can please call her

## 2022-10-27 NOTE — TELEPHONE ENCOUNTER
Spoke with gaby at this time. She explained how the diagnosis is pulled due to best practice advisories and based on elevated creatinine result. 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 I explained more than likely it's not BP medications but more related to needing to drink more fluids. I advised the patient I will follow up with you in regards to your opinion on the matter. Please advise.

## 2022-10-28 NOTE — TELEPHONE ENCOUNTER
Left a detailed message with patient at this time. Explaining she does not have ckd d/t narrow lab reference range per dr. Gutierrez.

## 2022-11-07 ENCOUNTER — OFFICE VISIT (OUTPATIENT)
Dept: FAMILY MEDICINE | Facility: CLINIC | Age: 62
End: 2022-11-07
Payer: COMMERCIAL

## 2022-11-07 ENCOUNTER — TELEPHONE (OUTPATIENT)
Dept: INTERNAL MEDICINE | Facility: CLINIC | Age: 62
End: 2022-11-07

## 2022-11-07 VITALS
HEART RATE: 57 BPM | TEMPERATURE: 98 F | DIASTOLIC BLOOD PRESSURE: 82 MMHG | OXYGEN SATURATION: 97 % | HEIGHT: 65 IN | BODY MASS INDEX: 32.62 KG/M2 | WEIGHT: 195.81 LBS | RESPIRATION RATE: 18 BRPM | SYSTOLIC BLOOD PRESSURE: 132 MMHG

## 2022-11-07 DIAGNOSIS — E78.2 MIXED HYPERLIPIDEMIA: ICD-10-CM

## 2022-11-07 DIAGNOSIS — E11.9 TYPE 2 DIABETES MELLITUS WITHOUT COMPLICATION, WITHOUT LONG-TERM CURRENT USE OF INSULIN: Primary | ICD-10-CM

## 2022-11-07 DIAGNOSIS — D53.9 MACROCYTIC ANEMIA: ICD-10-CM

## 2022-11-07 DIAGNOSIS — R79.89 ELEVATED SERUM CREATININE: ICD-10-CM

## 2022-11-07 DIAGNOSIS — I10 PRIMARY HYPERTENSION: ICD-10-CM

## 2022-11-07 DIAGNOSIS — F32.A ANXIETY AND DEPRESSION: ICD-10-CM

## 2022-11-07 DIAGNOSIS — Z11.4 ENCOUNTER FOR SCREENING FOR HIV: ICD-10-CM

## 2022-11-07 DIAGNOSIS — F41.9 ANXIETY AND DEPRESSION: ICD-10-CM

## 2022-11-07 DIAGNOSIS — Z11.59 ENCOUNTER FOR HEPATITIS C SCREENING TEST FOR LOW RISK PATIENT: ICD-10-CM

## 2022-11-07 DIAGNOSIS — K21.9 GASTROESOPHAGEAL REFLUX DISEASE, UNSPECIFIED WHETHER ESOPHAGITIS PRESENT: ICD-10-CM

## 2022-11-07 PROCEDURE — 99214 PR OFFICE/OUTPT VISIT, EST, LEVL IV, 30-39 MIN: ICD-10-PCS | Mod: ,,, | Performed by: STUDENT IN AN ORGANIZED HEALTH CARE EDUCATION/TRAINING PROGRAM

## 2022-11-07 PROCEDURE — 1159F PR MEDICATION LIST DOCUMENTED IN MEDICAL RECORD: ICD-10-PCS | Mod: CPTII,,, | Performed by: STUDENT IN AN ORGANIZED HEALTH CARE EDUCATION/TRAINING PROGRAM

## 2022-11-07 PROCEDURE — 3075F SYST BP GE 130 - 139MM HG: CPT | Mod: CPTII,,, | Performed by: STUDENT IN AN ORGANIZED HEALTH CARE EDUCATION/TRAINING PROGRAM

## 2022-11-07 PROCEDURE — 3008F PR BODY MASS INDEX (BMI) DOCUMENTED: ICD-10-PCS | Mod: CPTII,,, | Performed by: STUDENT IN AN ORGANIZED HEALTH CARE EDUCATION/TRAINING PROGRAM

## 2022-11-07 PROCEDURE — 99214 OFFICE O/P EST MOD 30 MIN: CPT | Mod: ,,, | Performed by: STUDENT IN AN ORGANIZED HEALTH CARE EDUCATION/TRAINING PROGRAM

## 2022-11-07 PROCEDURE — 3079F DIAST BP 80-89 MM HG: CPT | Mod: CPTII,,, | Performed by: STUDENT IN AN ORGANIZED HEALTH CARE EDUCATION/TRAINING PROGRAM

## 2022-11-07 PROCEDURE — 1159F MED LIST DOCD IN RCRD: CPT | Mod: CPTII,,, | Performed by: STUDENT IN AN ORGANIZED HEALTH CARE EDUCATION/TRAINING PROGRAM

## 2022-11-07 PROCEDURE — 3008F BODY MASS INDEX DOCD: CPT | Mod: CPTII,,, | Performed by: STUDENT IN AN ORGANIZED HEALTH CARE EDUCATION/TRAINING PROGRAM

## 2022-11-07 PROCEDURE — 3075F PR MOST RECENT SYSTOLIC BLOOD PRESS GE 130-139MM HG: ICD-10-PCS | Mod: CPTII,,, | Performed by: STUDENT IN AN ORGANIZED HEALTH CARE EDUCATION/TRAINING PROGRAM

## 2022-11-07 PROCEDURE — 3079F PR MOST RECENT DIASTOLIC BLOOD PRESSURE 80-89 MM HG: ICD-10-PCS | Mod: CPTII,,, | Performed by: STUDENT IN AN ORGANIZED HEALTH CARE EDUCATION/TRAINING PROGRAM

## 2022-11-07 NOTE — ASSESSMENT & PLAN NOTE
- Lipid panel positive for elevated cholesterol 220 from 10/17/22.  - Calcium score 2 from 05/09/22.   - Recommend statin therapy due to comorbid conditions. Patient would like to defer statin therapy at this time. Would start moderate-intensity statin if patient is amenable in the future and titrate to high-intensity if appropriate.  - Patient counseled regarding lifestyle modifications with diet and exercise.  - There are some dietary supplements that may be of benefit that were discussed with patient which include: omega-3 fatty acids, red yeast rice, berberine, and probiotics.  - Continue to monitor.

## 2022-11-07 NOTE — PROGRESS NOTES
"Subjective:      Patient ID: Jenny Epps is a 62 y.o.  female.    Chief Complaint: Establish Care    NIDDMII: A1c 5.6 from 10/17/22. Patient not taking any medications. Patient following with an optometrist.    HTN/HLD: /82. Patient taking Olmesartan-HCTZ daily. Calcium score 2 from 05/09/22. Lipid panel positive for elevated cholesterol 220 from 10/17/22.    GERD: Patient taking Protonix.    Anxiety/Depression: Patient taking Lexapro.    Macrocytic Anemia: H/H 11.1/35.7; MCV 94.9 from 10/17/22. Ferritin 33.42 and B12 1777 from 10/17/22.     Preventative Health: Patient following with Dr. Cee Lombardi with OB-GYN for her well-woman exam. Patient following with Dr. Gregory Zazueta with GI for her colon CA surveillance.    Review of Systems   Constitutional:  Negative for activity change, fatigue and fever.   Eyes:  Negative for visual disturbance.   Respiratory:  Negative for apnea, cough and shortness of breath.    Cardiovascular:  Negative for chest pain and leg swelling.   Gastrointestinal:  Negative for abdominal pain, diarrhea and nausea.   Genitourinary:  Negative for dysuria and hematuria.   Musculoskeletal:  Negative for arthralgias, back pain and myalgias.   Skin:  Negative for rash and wound.   Neurological:  Negative for dizziness, weakness, numbness and headaches.   Psychiatric/Behavioral:  Negative for behavioral problems and dysphoric mood.      Objective:   /82 (BP Location: Right arm, Patient Position: Sitting, BP Method: Large (Automatic))   Pulse (!) 57   Temp 98.1 °F (36.7 °C) (Oral)   Resp 18   Ht 5' 5" (1.651 m)   Wt 88.8 kg (195 lb 12.8 oz)   SpO2 97%   BMI 32.58 kg/m²     Physical Exam  Vitals and nursing note reviewed.   Constitutional:       General: She is not in acute distress.     Appearance: Normal appearance. She is not ill-appearing or toxic-appearing.   HENT:      Head: Normocephalic and atraumatic.      Mouth/Throat:      Mouth: Mucous membranes " are moist.      Pharynx: Oropharynx is clear.   Cardiovascular:      Rate and Rhythm: Normal rate and regular rhythm.      Heart sounds: Normal heart sounds. No murmur heard.  Pulmonary:      Effort: Pulmonary effort is normal. No respiratory distress.      Breath sounds: Normal breath sounds.   Abdominal:      General: There is no distension.      Palpations: Abdomen is soft.      Tenderness: There is no abdominal tenderness.   Musculoskeletal:         General: No deformity.      Cervical back: Neck supple. No tenderness.      Right lower leg: No edema.      Left lower leg: No edema.   Lymphadenopathy:      Cervical: No cervical adenopathy.   Skin:     General: Skin is warm and dry.      Findings: No lesion or rash.   Neurological:      General: No focal deficit present.      Mental Status: She is alert.   Psychiatric:         Mood and Affect: Mood normal.         Behavior: Behavior normal.         Thought Content: Thought content normal.         Judgment: Judgment normal.     Assessment/Plan:   1. Type 2 diabetes mellitus without complication, without long-term current use of insulin  Assessment & Plan:  - A1c 5.6 from 10/17/22.  - Patient diet-controlled.  - Patient following with an optometrist.  - Will need to infer about diabetic foot exam at follow-up.    Orders:  -     Basic Metabolic Panel; Future; Expected date: 11/07/2022  -     Microalbumin/Creatinine Ratio, Urine    2. Primary hypertension  Assessment & Plan:  - /82, well-controlled.  - Continue Olmesartan-HCTZ 20mg-12.5mg daily.    Orders:  -     Basic Metabolic Panel; Future; Expected date: 11/07/2022    3. Mixed hyperlipidemia  Assessment & Plan:  - Lipid panel positive for elevated cholesterol 220 from 10/17/22.  - Calcium score 2 from 05/09/22.   - Recommend statin therapy due to comorbid conditions. Patient would like to defer statin therapy at this time. Would start moderate-intensity statin if patient is amenable in the future and titrate  to high-intensity if appropriate.  - Patient counseled regarding lifestyle modifications with diet and exercise.  - There are some dietary supplements that may be of benefit that were discussed with patient which include: omega-3 fatty acids, red yeast rice, berberine, and probiotics.  - Continue to monitor.        4. Gastroesophageal reflux disease, unspecified whether esophagitis present  Assessment & Plan:  - Continue Protonix 40mg daily.      5. Anxiety and depression  Assessment & Plan:  - Continue Lexapro 5mg daily.      6. Macrocytic anemia  Comments:  - If folate normal, will consider starting iron supplementation.  Orders:  -     Folate; Future; Expected date: 11/07/2022    7. Elevated serum creatinine  -     Basic Metabolic Panel; Future; Expected date: 11/07/2022    8. Encounter for screening for HIV  -     HIV 1/2 Ag/Ab (4th Gen); Future; Expected date: 11/07/2022    9. Encounter for hepatitis C screening test for low risk patient  -     Hepatitis C Antibody; Future; Expected date: 11/07/2022     Follow up in about 3 months (around 2/7/2023) for Wellness.

## 2022-11-07 NOTE — ASSESSMENT & PLAN NOTE
- A1c 5.6 from 10/17/22.  - Patient diet-controlled.  - Patient following with an optometrist.  - Will need to infer about diabetic foot exam at follow-up.

## 2022-11-07 NOTE — TELEPHONE ENCOUNTER
----- Message from Tricia Harrison sent at 11/7/2022  2:57 PM CST -----  Regarding: appt  Pt called to cancel appt in May seeing another PCP

## 2022-11-21 ENCOUNTER — TELEPHONE (OUTPATIENT)
Dept: FAMILY MEDICINE | Facility: CLINIC | Age: 62
End: 2022-11-21
Payer: COMMERCIAL

## 2022-11-21 NOTE — TELEPHONE ENCOUNTER
Called pt. She took her b/p while I was on the phone with her. The reading was 163/94. She states that at this time she does not have the blurriness and the headache is better. She states that she made an appt with her cardiologist, Dr Giron, on tomorrow morning. She asked me if she can take more Amlodipine. I advised her to contact the Cardiologist office to get advise from them. Verbal understanding was given.

## 2022-11-21 NOTE — TELEPHONE ENCOUNTER
----- Message from Heide Hernadez sent at 11/21/2022  1:11 PM CST -----  Regarding: Elevated Blood Pressure  .Type:  Needs Medical Advice    Who Called: pt  Symptoms (please be specific): Elevated BP   How long has patient had these symptoms:  2days  Pharmacy name and phone #:  [id number] - [place], [place] - [address] AT [facility]  Would the patient rather a call back or a response via [facility]? Call back  Best Call Back Number: [phone]  Additional Information: BP readings 168/99, 170/100, 167/101 reading on 11/21 between 8 and 9am and the last was at 1pm. Please give her a call

## 2022-11-21 NOTE — TELEPHONE ENCOUNTER
Spoke to pt. She states that she has a slight headache and slight blurriness. She is currently taking Olmesartan HCTZ 20-12.5mg and states that she has Amlodidpine 5mg daily/prn. She states that she took both meds on today. Last b/p reading was 167/101 at 1pm. Please advise on recommendations.

## 2022-11-22 ENCOUNTER — LAB VISIT (OUTPATIENT)
Dept: LAB | Facility: HOSPITAL | Age: 62
End: 2022-11-22
Attending: STUDENT IN AN ORGANIZED HEALTH CARE EDUCATION/TRAINING PROGRAM
Payer: COMMERCIAL

## 2022-11-22 DIAGNOSIS — I10 PRIMARY HYPERTENSION: ICD-10-CM

## 2022-11-22 DIAGNOSIS — R79.89 ELEVATED SERUM CREATININE: ICD-10-CM

## 2022-11-22 DIAGNOSIS — Z11.59 ENCOUNTER FOR HEPATITIS C SCREENING TEST FOR LOW RISK PATIENT: ICD-10-CM

## 2022-11-22 DIAGNOSIS — D53.9 MACROCYTIC ANEMIA: ICD-10-CM

## 2022-11-22 DIAGNOSIS — Z11.4 ENCOUNTER FOR SCREENING FOR HIV: ICD-10-CM

## 2022-11-22 DIAGNOSIS — E11.9 TYPE 2 DIABETES MELLITUS WITHOUT COMPLICATION, WITHOUT LONG-TERM CURRENT USE OF INSULIN: ICD-10-CM

## 2022-11-22 LAB
ANION GAP SERPL CALC-SCNC: 9 MEQ/L
BUN SERPL-MCNC: 14 MG/DL (ref 9.8–20.1)
CALCIUM SERPL-MCNC: 10 MG/DL (ref 8.4–10.2)
CHLORIDE SERPL-SCNC: 105 MMOL/L (ref 98–107)
CO2 SERPL-SCNC: 30 MMOL/L (ref 23–31)
CREAT SERPL-MCNC: 1.01 MG/DL (ref 0.55–1.02)
CREAT UR-MCNC: 148.2 MG/DL (ref 47–110)
CREAT/UREA NIT SERPL: 14
FOLATE SERPL-MCNC: 14.7 NG/ML (ref 7–31.4)
GFR SERPLBLD CREATININE-BSD FMLA CKD-EPI: >60 MLS/MIN/1.73/M2
GLUCOSE SERPL-MCNC: 87 MG/DL (ref 82–115)
HCV AB SERPL QL IA: REACTIVE
HIV 1+2 AB+HIV1 P24 AG SERPL QL IA: NONREACTIVE
MICROALBUMIN UR-MCNC: <5 UG/ML
MICROALBUMIN/CREAT RATIO PNL UR: <3.4 MG/GM CR (ref 0–30)
POTASSIUM SERPL-SCNC: 3.7 MMOL/L (ref 3.5–5.1)
SODIUM SERPL-SCNC: 144 MMOL/L (ref 136–145)

## 2022-11-22 PROCEDURE — 36415 COLL VENOUS BLD VENIPUNCTURE: CPT

## 2022-11-22 PROCEDURE — 80048 BASIC METABOLIC PNL TOTAL CA: CPT

## 2022-11-22 PROCEDURE — 87389 HIV-1 AG W/HIV-1&-2 AB AG IA: CPT

## 2022-11-22 PROCEDURE — 86803 HEPATITIS C AB TEST: CPT

## 2022-11-22 PROCEDURE — 82746 ASSAY OF FOLIC ACID SERUM: CPT

## 2022-11-28 DIAGNOSIS — R76.8 POSITIVE HEPATITIS C ANTIBODY TEST: Primary | ICD-10-CM

## 2022-12-01 ENCOUNTER — TELEPHONE (OUTPATIENT)
Dept: FAMILY MEDICINE | Facility: CLINIC | Age: 62
End: 2022-12-01
Payer: COMMERCIAL

## 2022-12-01 NOTE — TELEPHONE ENCOUNTER
I spoke with patient and addressed her concerns regarding her labs results and having to be drawn for the hep c quanitative. She stated she tested positive for this before. Patient was requesting Dr. Mckay know that she f/u with cardiologist, etc. for her elevated b/p. She did say the cardiologist went over her lab results. She also requesting the results of the microalbumin which was abnormal, she stated her creatinine has been elevated before in the past. Please advise. Kisha       Patient wanted Dr. Mckay notified about what is going on.

## 2022-12-01 NOTE — TELEPHONE ENCOUNTER
----- Message from Claudia Joe sent at 12/1/2022  9:35 AM CST -----  Regarding: results  .Type:  Needs Medical Advice    Who Called: pt   Symptoms (please be specific):    How long has patient had these symptoms:    Pharmacy name and phone #:    Would the patient rather a call back or a response via [facility]? Call back   Best Call Back Number: [phone]  Additional Information: pt would like a call back to discuss the results of her blood work that was done a few weeeks ago.  She wants to be called after 2:30

## 2022-12-01 NOTE — TELEPHONE ENCOUNTER
Attempted to contact patient x2 without success. Left a voicemail that I would attempt to contact the patient again tomorrow.

## 2022-12-01 NOTE — TELEPHONE ENCOUNTER
Pt called back and spoke to Christy Mcgovern LPN prior to me being able to return her call. Christy discussed pts labs results with her.

## 2022-12-02 NOTE — TELEPHONE ENCOUNTER
"Called and spoke with the patient twice on the phone regarding her concerns.    Patient was upset that her HCV RNA had to be drawn again separately when her Hepatitis C Antibody was positive. She expressed that when she had a positive Hepatitis C Antibody test in the past, that it was simply reflexed. Can we please verify this with our lab that if a Hepatitis C Antibody is positive, if it can simply be reflexed or if patients do need to come in again to the lab for a separate draw.    Patient was also upset that she was not followed-up with after her Cardiology office visit with Dr. Giron regarding her blood pressure. I informed the patient that I did review the telephone messages from 11/21/22 when the patient had contacted the office regarding elevated blood pressures. I expressed to the patient that I did not believe another follow-up regarding this was appropriate as it was addressed by our staff appropriately as patient's symptoms were improving, and she had a follow-up with Cardiology regarding her blood pressure. Patient said that she was not happy with the care she received in this matter and that, "medicine is just not practiced like it used to be."     I informed the patient that I resulted her labs this Monday and that this was was sent to my nursing pool as well. I did also review the patient's lab results with her and answered all of her questions.  "

## 2022-12-05 ENCOUNTER — LAB VISIT (OUTPATIENT)
Dept: LAB | Facility: HOSPITAL | Age: 62
End: 2022-12-05
Attending: STUDENT IN AN ORGANIZED HEALTH CARE EDUCATION/TRAINING PROGRAM
Payer: COMMERCIAL

## 2022-12-05 DIAGNOSIS — R76.8 POSITIVE HEPATITIS C ANTIBODY TEST: ICD-10-CM

## 2022-12-05 PROCEDURE — 36415 COLL VENOUS BLD VENIPUNCTURE: CPT

## 2022-12-07 LAB — HCV RNA SERPL NAA+PROBE-ACNC: NORMAL IU/ML

## 2022-12-21 ENCOUNTER — OFFICE VISIT (OUTPATIENT)
Dept: URGENT CARE | Facility: CLINIC | Age: 62
End: 2022-12-21
Payer: COMMERCIAL

## 2022-12-21 ENCOUNTER — OCCUPATIONAL HEALTH (OUTPATIENT)
Dept: URGENT CARE | Facility: CLINIC | Age: 62
End: 2022-12-21
Payer: COMMERCIAL

## 2022-12-21 ENCOUNTER — NURSE TRIAGE (OUTPATIENT)
Dept: ADMINISTRATIVE | Facility: CLINIC | Age: 62
End: 2022-12-21
Payer: COMMERCIAL

## 2022-12-21 VITALS
SYSTOLIC BLOOD PRESSURE: 117 MMHG | RESPIRATION RATE: 18 BRPM | DIASTOLIC BLOOD PRESSURE: 77 MMHG | OXYGEN SATURATION: 97 % | TEMPERATURE: 99 F | HEART RATE: 83 BPM | HEIGHT: 65 IN | BODY MASS INDEX: 32.69 KG/M2 | WEIGHT: 196.19 LBS

## 2022-12-21 DIAGNOSIS — R07.9 CHEST PAIN, UNSPECIFIED TYPE: Primary | ICD-10-CM

## 2022-12-21 DIAGNOSIS — R50.9 FEVER, UNSPECIFIED FEVER CAUSE: Primary | ICD-10-CM

## 2022-12-21 DIAGNOSIS — R50.9 FEVER, UNSPECIFIED FEVER CAUSE: ICD-10-CM

## 2022-12-21 DIAGNOSIS — U07.1 COVID-19 VIRUS DETECTED: ICD-10-CM

## 2022-12-21 LAB
CTP QC/QA: YES
SARS-COV-2 RDRP RESP QL NAA+PROBE: POSITIVE

## 2022-12-21 PROCEDURE — 3008F PR BODY MASS INDEX (BMI) DOCUMENTED: ICD-10-PCS | Mod: CPTII,,, | Performed by: NURSE PRACTITIONER

## 2022-12-21 PROCEDURE — 3061F NEG MICROALBUMINURIA REV: CPT | Mod: CPTII,,, | Performed by: NURSE PRACTITIONER

## 2022-12-21 PROCEDURE — 4010F ACE/ARB THERAPY RXD/TAKEN: CPT | Mod: CPTII,,, | Performed by: NURSE PRACTITIONER

## 2022-12-21 PROCEDURE — 3074F SYST BP LT 130 MM HG: CPT | Mod: CPTII,,, | Performed by: NURSE PRACTITIONER

## 2022-12-21 PROCEDURE — 3008F BODY MASS INDEX DOCD: CPT | Mod: CPTII,,, | Performed by: NURSE PRACTITIONER

## 2022-12-21 PROCEDURE — 3078F DIAST BP <80 MM HG: CPT | Mod: CPTII,,, | Performed by: NURSE PRACTITIONER

## 2022-12-21 PROCEDURE — 3066F PR DOCUMENTATION OF TREATMENT FOR NEPHROPATHY: ICD-10-PCS | Mod: CPTII,,, | Performed by: NURSE PRACTITIONER

## 2022-12-21 PROCEDURE — 1160F PR REVIEW ALL MEDS BY PRESCRIBER/CLIN PHARMACIST DOCUMENTED: ICD-10-PCS | Mod: CPTII,,, | Performed by: NURSE PRACTITIONER

## 2022-12-21 PROCEDURE — 3078F PR MOST RECENT DIASTOLIC BLOOD PRESSURE < 80 MM HG: ICD-10-PCS | Mod: CPTII,,, | Performed by: NURSE PRACTITIONER

## 2022-12-21 PROCEDURE — 3061F PR NEG MICROALBUMINURIA RESULT DOCUMENTED/REVIEW: ICD-10-PCS | Mod: CPTII,,, | Performed by: NURSE PRACTITIONER

## 2022-12-21 PROCEDURE — 99212 OFFICE O/P EST SF 10 MIN: CPT | Mod: PBBFAC

## 2022-12-21 PROCEDURE — 3066F NEPHROPATHY DOC TX: CPT | Mod: CPTII,,, | Performed by: NURSE PRACTITIONER

## 2022-12-21 PROCEDURE — 1159F PR MEDICATION LIST DOCUMENTED IN MEDICAL RECORD: ICD-10-PCS | Mod: CPTII,,, | Performed by: NURSE PRACTITIONER

## 2022-12-21 PROCEDURE — 87635 SARS-COV-2 COVID-19 AMP PRB: CPT | Mod: PBBFAC

## 2022-12-21 PROCEDURE — 1159F MED LIST DOCD IN RCRD: CPT | Mod: CPTII,,, | Performed by: NURSE PRACTITIONER

## 2022-12-21 PROCEDURE — 99202 PR OFFICE/OUTPT VISIT, NEW, LEVL II, 15-29 MIN: ICD-10-PCS | Mod: ,,, | Performed by: NURSE PRACTITIONER

## 2022-12-21 PROCEDURE — 1160F RVW MEDS BY RX/DR IN RCRD: CPT | Mod: CPTII,,, | Performed by: NURSE PRACTITIONER

## 2022-12-21 PROCEDURE — 99202 OFFICE O/P NEW SF 15 MIN: CPT | Mod: ,,, | Performed by: NURSE PRACTITIONER

## 2022-12-21 PROCEDURE — 3074F PR MOST RECENT SYSTOLIC BLOOD PRESSURE < 130 MM HG: ICD-10-PCS | Mod: CPTII,,, | Performed by: NURSE PRACTITIONER

## 2022-12-21 PROCEDURE — 4010F PR ACE/ARB THEARPY RXD/TAKEN: ICD-10-PCS | Mod: CPTII,,, | Performed by: NURSE PRACTITIONER

## 2022-12-21 NOTE — PROGRESS NOTES
"Subjective:       Patient ID: Jenny Epps is a 62 y.o. female.    Vitals:  height is 5' 5" (1.651 m) and weight is 89 kg (196 lb 3.2 oz). Her oral temperature is 98.5 °F (36.9 °C). Her blood pressure is 117/77 and her pulse is 83. Her respiration is 18 and oxygen saturation is 97%.     Chief Complaint: Chest Pain (Pt presents to clinic with chest pain x Sat night. She states that the pain does not radiate but feels like acid reflux, there is also burning in throat. Pt is taking pantoprazole and quita sue, pantoprazole was taken around 1. Tested cov pos today.)    62-year-old female presents with midsternal chest pain without shortness of breath nausea or vomiting.  Onset several days ago.  History of acid reflux no relief with Protonix.  States went to a drive-through COVID-19 testing site was notified positive.  Here for an EKG.    Cardiovascular:  Positive for chest pain. Negative for chest trauma, leg swelling, palpitations, sob on exertion and passing out.   Gastrointestinal:  Positive for heartburn.     Objective:      Physical Exam   Constitutional: She is oriented to person, place, and time. She appears well-developed. She is cooperative.  Non-toxic appearance. She does not appear ill. No distress.   HENT:   Head: Normocephalic and atraumatic.   Ears:   Right Ear: Hearing, tympanic membrane, external ear and ear canal normal.   Left Ear: Hearing, tympanic membrane, external ear and ear canal normal.   Nose: Nose normal. No mucosal edema, rhinorrhea or nasal deformity. No epistaxis. Right sinus exhibits no maxillary sinus tenderness and no frontal sinus tenderness. Left sinus exhibits no maxillary sinus tenderness and no frontal sinus tenderness.   Mouth/Throat: Uvula is midline, oropharynx is clear and moist and mucous membranes are normal. No trismus in the jaw. Normal dentition. No uvula swelling. No posterior oropharyngeal erythema.   Eyes: Conjunctivae and lids are normal. Right eye exhibits no " discharge. Left eye exhibits no discharge. No scleral icterus.   Neck: Trachea normal and phonation normal. Neck supple.   Cardiovascular: Normal rate, regular rhythm, normal heart sounds and normal pulses.   Pulmonary/Chest: Effort normal and breath sounds normal. No respiratory distress.   Abdominal: Normal appearance and bowel sounds are normal. She exhibits no distension and no mass. Soft. There is no abdominal tenderness.   Musculoskeletal: Normal range of motion.         General: No deformity. Normal range of motion.   Neurological: She is alert and oriented to person, place, and time. She exhibits normal muscle tone. Coordination normal.   Skin: Skin is warm, dry, intact, not diaphoretic and not pale.   Psychiatric: Her speech is normal and behavior is normal. Judgment and thought content normal.   Nursing note and vitals reviewed.    EKG heart rate 72 beats per minute, sinus arrhythmia.  Mid Sternal chest pain not radiating, no shortness of breath.  Patient informed to go to ER, refuses EMS transport prefers to go by private vehicle.      Assessment:       1. Chest pain, unspecified type          Plan:     Go to ER now due to midsternal chest pain, no relief for acid reflux using Protonix.    Chest pain, unspecified type

## 2022-12-21 NOTE — TELEPHONE ENCOUNTER
Patient stopped taking protonix 3-4 days. States that she has been having chest pain >3 days despite restarting her Protonix. Also dx with covid today. Advised per protocol to be seen in the office today. Patient plans to go to the nearest .  Advised the patient to call back with any further questions or if symptoms worsen. Patient VU.      Reason for Disposition   Chest pain(s) lasting a few seconds persists > 3 days    Additional Information   Negative: SEVERE difficulty breathing (e.g., struggling for each breath, speaks in single words)   Negative: Passed out (i.e., fainted, collapsed and was not responding)   Negative: Difficult to awaken or acting confused (e.g., disoriented, slurred speech)   Negative: Shock suspected (e.g., cold/pale/clammy skin, too weak to stand, low BP, rapid pulse)   Negative: Visible sweat on face or sweat dripping down face   Negative: Heart beating < 50 beats per minute OR > 140 beats per minute   Negative: Sounds like a life-threatening emergency to the triager   Negative: Chest pain lasting longer than 5 minutes and ANY of the following:* Over 44 years old* Over 30 years old and at least one cardiac risk factor (e.g., diabetes mellitus, high blood pressure, high cholesterol, smoker, or strong family history of heart disease)* History of heart disease (i.e., angina, heart attack, heart failure, bypass surgery, takes nitroglycerin)* Pain is crushing, pressure-like, or heavy   Negative: SEVERE chest pain   Negative: Pain also in shoulder(s) or arm(s) or jaw   Negative: Difficulty breathing   Negative: Cocaine use within last 3 days   Negative: Major surgery in the past month   Negative: Hip or leg fracture (broken bone) in past month (or had cast on leg or ankle in past month)   Negative: Illness requiring prolonged bedrest in past month (e.g., immobilization, long hospital stay)   Negative: Long-distance travel in past month (e.g., car, bus, train, plane; with trip lasting 6 or more  hours)   Negative: History of prior 'blood clot' in leg or lungs (i.e., deep vein thrombosis, pulmonary embolism)   Negative: History of inherited increased risk of blood clots (e.g., Factor 5 Leiden, Anti-thrombin 3, Protein C or Protein S deficiency, Prothrombin mutation)   Negative: Cancer treatment in the past two months (or has cancer now)   Negative: Heart beating irregularly or very rapidly   Negative: Chest pain lasting longer than 5 minutes and occurred in last 3 days (72 hours) (Exception: feels exactly the same as previously diagnosed heartburn and has accompanying sour taste in mouth)   Negative: Chest pain or 'angina' comes and goes and is happening more often (increasing in frequency) or getting worse (increasing in severity) (Exception: chest pains that last only a few seconds)   Negative: Dizziness or lightheadedness   Negative: Coughing up blood   Negative: Patient sounds very sick or weak to the triager   Negative: Patient says chest pain feels exactly the same as previously diagnosed 'heartburn' and describes burning in chest and accompanying sour taste in mouth   Negative: Fever > 100.4 F (38.0 C)    Protocols used: Chest Pain-A-OH

## 2022-12-22 ENCOUNTER — NURSE TRIAGE (OUTPATIENT)
Dept: ADMINISTRATIVE | Facility: CLINIC | Age: 62
End: 2022-12-22
Payer: COMMERCIAL

## 2022-12-22 DIAGNOSIS — U07.1 COVID: Primary | ICD-10-CM

## 2022-12-22 NOTE — TELEPHONE ENCOUNTER
Recent exposure to covid this weekend. Began having cold symptoms Sunday hx of htn co congestion and sweating [facility] denies any new symptoms wants information on Covid dx and what to expect. Pt given care advise per protocol to follow home care.   Reason for Disposition   [1] COVID-19 diagnosed by positive lab test (e.g., PCR, rapid self-test kit) AND [2] mild symptoms (e.g., cough, fever, others) AND [3] no complications or SOB    Additional Information   Negative: SEVERE difficulty breathing (e.g., struggling for each breath, speaks in single words)   Negative: Difficult to awaken or acting confused (e.g., disoriented, slurred speech)   Negative: Bluish (or gray) lips or face now   Negative: Shock suspected (e.g., cold/pale/clammy skin, too weak to stand, low BP, rapid pulse)   Negative: Sounds like a life-threatening emergency to the triager   Negative: [1] Diagnosed or suspected COVID-19 AND [2] symptoms lasting 3 or more weeks   Negative: SEVERE or constant chest pain or pressure  (Exception: Mild central chest pain, present only when coughing.)   Negative: MODERATE difficulty breathing (e.g., speaks in phrases, SOB even at rest, pulse 100-120)   Negative: Headache and stiff neck (can't touch chin to chest)   Negative: Oxygen level (e.g., pulse oximetry) 90 percent or lower   Negative: Chest pain or pressure  (Exception: MILD central chest pain, present only when coughing.)   Negative: Patient sounds very sick or weak to the triager   Negative: MILD difficulty breathing (e.g., minimal/no SOB at rest, SOB with walking, pulse <100)   Negative: Fever > 103 F (39.4 C)   Negative: [1] Fever > 101 F (38.3 C) AND [2] over 60 years of age   Negative: [1] Fever > 100.0 F (37.8 C) AND [2] bedridden (e.g., CVA, chronic illness, recovering from surgery)   Negative: [1] HIGH RISK for severe COVID complications (e.g., weak immune system, age > 64 years, obesity with BMI of 30 or higher,  pregnant, chronic lung disease or other chronic medical condition) AND [2] COVID symptoms (e.g., cough, fever)  (Exceptions: Already seen by PCP and no new or worsening symptoms.)   Negative: [1] HIGH RISK patient AND [2] influenza is widespread in the community AND [3] ONE OR MORE respiratory symptoms: cough, sore throat, runny or stuffy nose   Negative: [1] HIGH RISK patient AND [2] influenza exposure within the last 7 days AND [3] ONE OR MORE respiratory symptoms: cough, sore throat, runny or stuffy nose   Negative: Oxygen level (e.g., pulse oximetry) 91 to 94 percent   Negative: [1] COVID-19 infection suspected by caller or triager AND [2] mild symptoms (cough, fever, or others) AND [3] negative COVID-19 rapid test   Negative: Fever present > 3 days (72 hours)   Negative: [1] Fever returns after gone for over 24 hours AND [2] symptoms worse or not improved   Negative: [1] Continuous (nonstop) coughing interferes with work or school AND [2] no improvement using cough treatment per Care Advice   Negative: [1] COVID-19 diagnosed by positive lab test (e.g., PCR, rapid self-test kit) AND [2] NO symptoms (e.g., cough, fever, others)    Protocols used: Coronavirus (COVID-19) Diagnosed or [id number]

## 2022-12-22 NOTE — TELEPHONE ENCOUNTER
I see that patient tested positive for COVID on 12/21/22 and was also evaluated by Urgent Care. Will go ahead and send Paxlovid for COVID treatment for patient.    Recommend symptomatic treatment and quarantine for at least 5 days from symptoms onset. Continue COVID precautions such as social distancing, wearing a mask, and proper hand-hygiene.     She can also refer to the CDC website for more information on COVID.

## 2022-12-22 NOTE — PROGRESS NOTES
Subjective:       Patient ID: Jenny Epps is a 62 y.o. female.    Vitals:  vitals were not taken for this visit.     Chief Complaint: No chief complaint on file.    HPI  ROS    Objective:      Physical Exam      Assessment:       1. Fever, unspecified fever cause            Plan:         Fever, unspecified fever cause  -     POCT COVID-19 Rapid Screening

## 2022-12-23 NOTE — TELEPHONE ENCOUNTER
Pt notified. Verbal understanding given. She states that she doesn't feel as though she needs the Paxlovid. I advised her that she does not have to take it and that Dr Mckay had sent in the script if she needed. Verbal understanding given

## 2022-12-24 ENCOUNTER — NURSE TRIAGE (OUTPATIENT)
Dept: ADMINISTRATIVE | Facility: CLINIC | Age: 62
End: 2022-12-24
Payer: COMMERCIAL

## 2022-12-24 NOTE — TELEPHONE ENCOUNTER
CV 12/21.   Symptoms started 12/19. Still has very mild cough,  Scheduled to return to work tomorrow night, wanting to know if she can return tonight.     Triage completed. Discussed home care, callback symptoms, precautions; today is day 5, discussed requirements to end isolation & tomorrow being 1st possible day to end if requirements met. Pt vu.     Reason for Disposition   [1] COVID-19 diagnosed by positive lab test (e.g., PCR, rapid self-test kit) AND [2] mild symptoms (e.g., cough, fever, others) AND [3] no complications or SOB    Additional Information   Negative: SEVERE difficulty breathing (e.g., struggling for each breath, speaks in single words)   Negative: Difficult to awaken or acting confused (e.g., disoriented, slurred speech)   Negative: Bluish (or gray) lips or face now   Negative: Shock suspected (e.g., cold/pale/clammy skin, too weak to stand, low BP, rapid pulse)   Negative: Sounds like a life-threatening emergency to the triager   Negative: SEVERE or constant chest pain or pressure  (Exception: Mild central chest pain, present only when coughing.)   Negative: MODERATE difficulty breathing (e.g., speaks in phrases, SOB even at rest, pulse 100-120)   Negative: [1] Headache AND [2] stiff neck (can't touch chin to chest)   Negative: Chest pain or pressure  (Exception: MILD central chest pain, present only when coughing)   Negative: Patient sounds very sick or weak to the triager   Negative: MILD difficulty breathing (e.g., minimal/no SOB at rest, SOB with walking, pulse <100)   Negative: Fever > 103 F (39.4 C)   Negative: [1] Fever > 101 F (38.3 C) AND [2] age > 60 years   Negative: [1] Fever > 100.0 F (37.8 C) AND [2] bedridden (e.g., CVA, chronic illness, recovering from surgery)   Negative: Oxygen level (e.g., pulse oximetry) 91 to 94 percent   Negative: [1] HIGH RISK for severe COVID complications (e.g., weak immune system, age > 64 years, obesity with BMI 30 or higher, pregnant, chronic lung  disease or other chronic medical condition) AND [2] COVID symptoms (e.g., cough, fever)  (Exceptions: Already seen by PCP and no new or worsening symptoms.)   Negative: [1] HIGH RISK patient AND [2] influenza is widespread in the community AND [3] ONE OR MORE respiratory symptoms: cough, sore throat, runny or stuffy nose   Negative: [1] HIGH RISK patient AND [2] influenza exposure within the last 7 days AND [3] ONE OR MORE respiratory symptoms: cough, sore throat, runny or stuffy nose   Negative: Fever present > 3 days (72 hours)   Negative: [1] Fever returns after gone for over 24 hours AND [2] symptoms worse or not improved   Negative: [1] Continuous (nonstop) coughing interferes with work or school AND [2] no improvement using cough treatment per Care Advice   Negative: [1] COVID-19 infection suspected by caller or triager AND [2] mild symptoms (cough, fever, or others) AND [3] negative COVID-19 rapid test   Negative: Cough present > 3 weeks   Negative: [1] COVID-19 diagnosed by positive lab test (e.g., PCR, rapid self-test kit) AND [2] NO symptoms (e.g., cough, fever, others)    Protocols used: Coronavirus (COVID-19) Diagnosed or [id number]

## 2022-12-28 ENCOUNTER — DOCUMENTATION ONLY (OUTPATIENT)
Dept: ADMINISTRATIVE | Facility: HOSPITAL | Age: 62
End: 2022-12-28
Payer: COMMERCIAL

## 2023-02-08 ENCOUNTER — PATIENT MESSAGE (OUTPATIENT)
Dept: RESEARCH | Facility: HOSPITAL | Age: 63
End: 2023-02-08
Payer: COMMERCIAL

## 2023-02-15 ENCOUNTER — OFFICE VISIT (OUTPATIENT)
Dept: FAMILY MEDICINE | Facility: CLINIC | Age: 63
End: 2023-02-15
Payer: COMMERCIAL

## 2023-02-15 VITALS
OXYGEN SATURATION: 98 % | BODY MASS INDEX: 31.68 KG/M2 | HEART RATE: 66 BPM | SYSTOLIC BLOOD PRESSURE: 128 MMHG | RESPIRATION RATE: 18 BRPM | DIASTOLIC BLOOD PRESSURE: 78 MMHG | HEIGHT: 65 IN | WEIGHT: 190.13 LBS

## 2023-02-15 DIAGNOSIS — E11.9 TYPE 2 DIABETES MELLITUS WITHOUT COMPLICATION, WITHOUT LONG-TERM CURRENT USE OF INSULIN: ICD-10-CM

## 2023-02-15 DIAGNOSIS — F32.A ANXIETY AND DEPRESSION: ICD-10-CM

## 2023-02-15 DIAGNOSIS — D53.9 MACROCYTIC ANEMIA: ICD-10-CM

## 2023-02-15 DIAGNOSIS — F41.9 ANXIETY AND DEPRESSION: ICD-10-CM

## 2023-02-15 DIAGNOSIS — I10 PRIMARY HYPERTENSION: ICD-10-CM

## 2023-02-15 DIAGNOSIS — E78.2 MIXED HYPERLIPIDEMIA: ICD-10-CM

## 2023-02-15 DIAGNOSIS — K21.9 GASTROESOPHAGEAL REFLUX DISEASE, UNSPECIFIED WHETHER ESOPHAGITIS PRESENT: ICD-10-CM

## 2023-02-15 DIAGNOSIS — Z00.00 ANNUAL PHYSICAL EXAM: Primary | ICD-10-CM

## 2023-02-15 PROCEDURE — 99396 PR PREVENTIVE VISIT,EST,40-64: ICD-10-PCS | Mod: ,,, | Performed by: STUDENT IN AN ORGANIZED HEALTH CARE EDUCATION/TRAINING PROGRAM

## 2023-02-15 PROCEDURE — 3008F BODY MASS INDEX DOCD: CPT | Mod: CPTII,,, | Performed by: STUDENT IN AN ORGANIZED HEALTH CARE EDUCATION/TRAINING PROGRAM

## 2023-02-15 PROCEDURE — 3074F PR MOST RECENT SYSTOLIC BLOOD PRESSURE < 130 MM HG: ICD-10-PCS | Mod: CPTII,,, | Performed by: STUDENT IN AN ORGANIZED HEALTH CARE EDUCATION/TRAINING PROGRAM

## 2023-02-15 PROCEDURE — 4010F PR ACE/ARB THEARPY RXD/TAKEN: ICD-10-PCS | Mod: CPTII,,, | Performed by: STUDENT IN AN ORGANIZED HEALTH CARE EDUCATION/TRAINING PROGRAM

## 2023-02-15 PROCEDURE — 1159F MED LIST DOCD IN RCRD: CPT | Mod: CPTII,,, | Performed by: STUDENT IN AN ORGANIZED HEALTH CARE EDUCATION/TRAINING PROGRAM

## 2023-02-15 PROCEDURE — 3074F SYST BP LT 130 MM HG: CPT | Mod: CPTII,,, | Performed by: STUDENT IN AN ORGANIZED HEALTH CARE EDUCATION/TRAINING PROGRAM

## 2023-02-15 PROCEDURE — 3008F PR BODY MASS INDEX (BMI) DOCUMENTED: ICD-10-PCS | Mod: CPTII,,, | Performed by: STUDENT IN AN ORGANIZED HEALTH CARE EDUCATION/TRAINING PROGRAM

## 2023-02-15 PROCEDURE — 4010F ACE/ARB THERAPY RXD/TAKEN: CPT | Mod: CPTII,,, | Performed by: STUDENT IN AN ORGANIZED HEALTH CARE EDUCATION/TRAINING PROGRAM

## 2023-02-15 PROCEDURE — 1159F PR MEDICATION LIST DOCUMENTED IN MEDICAL RECORD: ICD-10-PCS | Mod: CPTII,,, | Performed by: STUDENT IN AN ORGANIZED HEALTH CARE EDUCATION/TRAINING PROGRAM

## 2023-02-15 PROCEDURE — 99396 PREV VISIT EST AGE 40-64: CPT | Mod: ,,, | Performed by: STUDENT IN AN ORGANIZED HEALTH CARE EDUCATION/TRAINING PROGRAM

## 2023-02-15 PROCEDURE — 3078F DIAST BP <80 MM HG: CPT | Mod: CPTII,,, | Performed by: STUDENT IN AN ORGANIZED HEALTH CARE EDUCATION/TRAINING PROGRAM

## 2023-02-15 PROCEDURE — 3078F PR MOST RECENT DIASTOLIC BLOOD PRESSURE < 80 MM HG: ICD-10-PCS | Mod: CPTII,,, | Performed by: STUDENT IN AN ORGANIZED HEALTH CARE EDUCATION/TRAINING PROGRAM

## 2023-02-15 RX ORDER — ESCITALOPRAM OXALATE 10 MG/1
10 TABLET ORAL DAILY
COMMUNITY

## 2023-02-15 RX ORDER — NYSTATIN AND TRIAMCINOLONE ACETONIDE 100000; 1 [USP'U]/G; MG/G
CREAM TOPICAL 3 TIMES DAILY
COMMUNITY
Start: 2022-11-02 | End: 2024-02-27

## 2023-02-15 RX ORDER — OLMESARTAN MEDOXOMIL 20 MG/1
TABLET ORAL
COMMUNITY
Start: 2022-12-19 | End: 2024-02-27

## 2023-02-15 RX ORDER — SUCRALFATE 1 G/1
1 TABLET ORAL 3 TIMES DAILY
COMMUNITY
Start: 2023-01-24

## 2023-02-15 NOTE — PROGRESS NOTES
"Subjective:      Patient ID: Jenny Epps is a 63 y.o.  female.    Chief Complaint: Wellness    Preventative Health: Patient not amenable to any vaccinations at this time. Patient following with Dr. Cee Lombardi with OB-GYN for her well-woman exam. Patient following with Dr. Gregory Zazueta with GI for her colon CA surveillance; she knows that she needs to reach out to GI for colon CA surveillance.    NIDDMII: A1c 5.6 from 10/17/22. Patient not taking any medications. Patient has an appointment with [facility] for her eye exam.     HTN/HLD: Patient following with Dr. Delbert Giron with Cardiology. /78. Patient taking Olmesartan-HCTZ daily. Calcium score 2 from 05/09/22. Lipid panel positive for elevated cholesterol 220 from 10/17/22.      GERD: Patient taking Protonix.     Anxiety/Depression: Patient requesting an increase in Lexapro.     Macrocytic Anemia: H/H 11.1/35.7; MCV 94.9 from 10/17/22. Ferritin 33.42 and B12 1777 from 10/17/22. Patient will try some iron supplementation OTC.    Review of Systems   Constitutional:  Negative for activity change, fatigue and fever.   Eyes:  Negative for visual disturbance.   Respiratory:  Negative for apnea, cough and shortness of breath.    Cardiovascular:  Negative for chest pain and leg swelling.   Gastrointestinal:  Negative for abdominal pain, blood in stool, diarrhea, nausea and vomiting.   Genitourinary:  Negative for dysuria and hematuria.   Musculoskeletal:  Negative for arthralgias, back pain and myalgias.   Skin:  Negative for rash and wound.   Neurological:  Negative for dizziness, weakness, numbness and headaches.   Psychiatric/Behavioral:  Negative for behavioral problems and dysphoric mood.      Objective:   /78 (BP Location: Left arm, Patient Position: Sitting, BP Method: Large (Automatic))   Pulse 66   Resp 18   Ht 5' 5" (1.651 m)   Wt 86.2 kg (190 lb 1.6 oz)   SpO2 98%   BMI 31.63 kg/m²     Physical " Exam  Vitals and nursing note reviewed.   Constitutional:       General: She is not in acute distress.     Appearance: Normal appearance. She is not ill-appearing or toxic-appearing.   HENT:      Head: Normocephalic and atraumatic.      Mouth/Throat:      Mouth: Mucous membranes are moist.      Pharynx: Oropharynx is clear.   Cardiovascular:      Rate and Rhythm: Normal rate and regular rhythm.      Pulses:           Dorsalis pedis pulses are 3+ on the right side and 3+ on the left side.      Heart sounds: Normal heart sounds. No murmur heard.  Pulmonary:      Effort: Pulmonary effort is normal. No respiratory distress.      Breath sounds: Normal breath sounds.   Abdominal:      General: There is no distension.      Palpations: Abdomen is soft.      Tenderness: There is no abdominal tenderness.   Musculoskeletal:         General: No deformity.      Cervical back: Neck supple. No tenderness.      Right lower leg: No edema.      Left lower leg: No edema.      Right foot: No deformity.      Left foot: No deformity.   Feet:      Right foot:      Protective Sensation: 10 sites tested.  10 sites sensed.      Skin integrity: Skin integrity normal.      Toenail Condition: Right toenails are normal.      Left foot:      Protective Sensation: 10 sites tested.  10 sites sensed.      Skin integrity: Skin integrity normal.      Toenail Condition: Left toenails are normal.   Lymphadenopathy:      Cervical: No cervical adenopathy.   Skin:     General: Skin is warm and dry.      Findings: No lesion or rash.   Neurological:      General: No focal deficit present.      Mental Status: She is alert.   Psychiatric:         Mood and Affect: Mood normal.         Behavior: Behavior normal.         Thought Content: Thought content normal.         Judgment: Judgment normal.     Assessment/Plan:   1. Annual physical exam  Comments:  - Health maintenance reviewed with patient.    2. Type 2 diabetes mellitus without complication, without  long-term current use of insulin  Assessment & Plan:  - A1c 5.6 from 10/17/22.  - A1c for routine monitoring.  - Urine microalbumin/Cr negative from 11/22/22.  - Diet-controlled.  - Patient has an appointment with [facility] tomorrow for her eye exam.  - Diabetic foot exam performed.    Orders:  -     Hemoglobin A1C; Future; Expected date: 05/15/2023    3. Primary hypertension  Assessment & Plan:  - Patient following with Dr. Delbert Giron with Cardiology.  - BP well-controlled.  - Continue Olmesartan-HCTZ 20mg-12.5mg daily.      4. Mixed hyperlipidemia  Assessment & Plan:  - Lipid panel positive for elevated cholesterol 220 from 10/17/22.  - Calcium score 2 from 05/09/22.   - Recommend statin therapy due to comorbid conditions. Patient would like to defer statin therapy at this time. Would start moderate-intensity statin if patient is amenable in the future and titrate to high-intensity if appropriate.  - Patient counseled regarding lifestyle modifications with diet and exercise.  - There are some dietary supplements that may be of benefit that were discussed with patient which include: omega-3 fatty acids, red yeast rice, berberine, and probiotics.  - Continue to monitor.        5. Gastroesophageal reflux disease, unspecified whether esophagitis present  Assessment & Plan:  - Continue Protonix 40mg daily.      6. Anxiety and depression  Assessment & Plan:  - Mood deteriorated.  - Increasing Lexapro from 5mg to 10mg daily.  - Patient will also look into counseling services.  - Re-evaluation in 1 month.      7. Macrocytic anemia  Assessment & Plan:  - Patient will start iron supplementation OTC.  - Continue to monitor.    Orders:  -     CBC Auto Differential; Future; Expected date: 05/15/2023  -     Ferritin; Future; Expected date: 05/15/2023  -     Iron and TIBC; Future; Expected date: 05/15/2023       Follow up in about 1 month (around 3/15/2023) for Chronic Medical Management.

## 2023-02-15 NOTE — ASSESSMENT & PLAN NOTE
- Mood deteriorated.  - Increasing Lexapro from 5mg to 10mg daily.  - Patient will also look into counseling services.  - Re-evaluation in 1 month.

## 2023-02-15 NOTE — ASSESSMENT & PLAN NOTE
- Patient following with Dr. Delbert Giron with Cardiology.  - BP well-controlled.  - Continue Olmesartan-HCTZ 20mg-12.5mg daily.

## 2023-02-15 NOTE — ASSESSMENT & PLAN NOTE
- A1c 5.6 from 10/17/22.  - A1c for routine monitoring.  - Urine microalbumin/Cr negative from 11/22/22.  - Diet-controlled.  - Patient has an appointment with [facility] tomorrow for her eye exam.  - Diabetic foot exam performed.

## 2023-02-23 LAB
LEFT EYE DM RETINOPATHY: NEGATIVE
RIGHT EYE DM RETINOPATHY: NEGATIVE

## 2023-04-27 ENCOUNTER — PATIENT OUTREACH (OUTPATIENT)
Dept: ADMINISTRATIVE | Facility: HOSPITAL | Age: 63
End: 2023-04-27
Payer: COMMERCIAL

## 2023-04-27 NOTE — PROGRESS NOTES
Population Health. Out Reach. Reviewing patient's chart for quality metrics. Records request sent to [facility] for DM eye exam.

## 2023-04-27 NOTE — LETTER
AUTHORIZATION FOR RELEASE OF   CONFIDENTIAL INFORMATION    Dear [facility],    We are seeing Jenny Epps, date of birth 1960, in the clinic at [facility]. PAVITHRA MCKEE DO is the patient's PCP. Jenny Epps has an outstanding lab/procedure at the time we reviewed her chart. In order to help keep her health information updated, she has authorized us to request the following medical record(s):        (  )  MAMMOGRAM                                      (  )  COLONOSCOPY      (  )  PAP SMEAR                                          (  )  OUTSIDE LAB RESULTS     (  )  DEXA SCAN                                          ( X )  EYE EXAM            (  )  FOOT EXAM                                          (  )  ENTIRE RECORD     (  )  OUTSIDE IMMUNIZATIONS                 (  )  _______________         Please fax records to [facility], MIMI NGUYEN, DO, [phone]     If you have any questions you can contact Kim Kim at [phone].       Patient Name: Jenny Epps  : 1960  Patient Phone #: [phone]

## 2023-05-08 ENCOUNTER — HOSPITAL ENCOUNTER (OUTPATIENT)
Dept: RADIOLOGY | Facility: HOSPITAL | Age: 63
Discharge: HOME | End: 2023-05-08
Attending: SPECIALIST
Payer: COMMERCIAL

## 2023-05-08 DIAGNOSIS — Z12.31 ENCOUNTER FOR SCREENING MAMMOGRAM FOR BREAST CANCER: ICD-10-CM

## 2023-05-08 DIAGNOSIS — Z13.820 SCREENING FOR OSTEOPOROSIS: ICD-10-CM

## 2023-05-08 PROCEDURE — 77067 SCR MAMMO BI INCL CAD: CPT | Mod: TC

## 2023-05-08 PROCEDURE — 77063 BREAST TOMOSYNTHESIS BI: CPT | Mod: 26,,, | Performed by: RADIOLOGY

## 2023-05-08 PROCEDURE — 77080 DXA BONE DENSITY AXIAL SKELETON 1 OR MORE SITES: ICD-10-PCS | Mod: 26,,, | Performed by: STUDENT IN AN ORGANIZED HEALTH CARE EDUCATION/TRAINING PROGRAM

## 2023-05-08 PROCEDURE — 77063 MAMMO DIGITAL SCREENING BILAT WITH TOMO: ICD-10-PCS | Mod: 26,,, | Performed by: RADIOLOGY

## 2023-05-08 PROCEDURE — 77067 SCR MAMMO BI INCL CAD: CPT | Mod: 26,,, | Performed by: RADIOLOGY

## 2023-05-08 PROCEDURE — 77080 DXA BONE DENSITY AXIAL: CPT | Mod: 26,,, | Performed by: STUDENT IN AN ORGANIZED HEALTH CARE EDUCATION/TRAINING PROGRAM

## 2023-05-08 PROCEDURE — 77080 DXA BONE DENSITY AXIAL: CPT | Mod: TC

## 2023-05-08 PROCEDURE — 77067 MAMMO DIGITAL SCREENING BILAT WITH TOMO: ICD-10-PCS | Mod: 26,,, | Performed by: RADIOLOGY

## 2023-05-15 ENCOUNTER — PATIENT MESSAGE (OUTPATIENT)
Dept: ADMINISTRATIVE | Facility: HOSPITAL | Age: 63
End: 2023-05-15
Payer: COMMERCIAL

## 2023-05-22 ENCOUNTER — LAB VISIT (OUTPATIENT)
Dept: LAB | Facility: HOSPITAL | Age: 63
End: 2023-05-22
Attending: SPECIALIST
Payer: COMMERCIAL

## 2023-05-22 DIAGNOSIS — F45.8 OTHER SOMATOFORM DISORDERS: ICD-10-CM

## 2023-05-22 DIAGNOSIS — G47.00 PERSISTENT DISORDER OF INITIATING OR MAINTAINING SLEEP: ICD-10-CM

## 2023-05-22 DIAGNOSIS — R53.81 DEBILITY: ICD-10-CM

## 2023-05-22 DIAGNOSIS — R73.01 IMPAIRED FASTING GLUCOSE: ICD-10-CM

## 2023-05-22 DIAGNOSIS — R63.5 ABNORMAL WEIGHT GAIN: ICD-10-CM

## 2023-05-22 DIAGNOSIS — R53.83 FATIGUE, UNSPECIFIED TYPE: ICD-10-CM

## 2023-05-22 DIAGNOSIS — E55.9 AVITAMINOSIS D: ICD-10-CM

## 2023-05-22 DIAGNOSIS — N95.1 SYMPTOMATIC MENOPAUSAL OR FEMALE CLIMACTERIC STATES: ICD-10-CM

## 2023-05-22 DIAGNOSIS — L65.9 BALDNESS: ICD-10-CM

## 2023-05-22 DIAGNOSIS — K59.00 CONSTIPATION, UNSPECIFIED CONSTIPATION TYPE: ICD-10-CM

## 2023-05-22 DIAGNOSIS — I10 ESSENTIAL HYPERTENSION, MALIGNANT: Primary | ICD-10-CM

## 2023-05-22 DIAGNOSIS — E78.5 HYPERLIPIDEMIA, UNSPECIFIED HYPERLIPIDEMIA TYPE: ICD-10-CM

## 2023-05-22 DIAGNOSIS — D53.9 MACROCYTIC ANEMIA: ICD-10-CM

## 2023-05-22 LAB
ALP SERPL-CCNC: 86 UNIT/L (ref 40–150)
ALT SERPL-CCNC: 10 UNIT/L (ref 0–55)
AST SERPL-CCNC: 18 UNIT/L (ref 5–34)
CHOLEST SERPL-MCNC: 208 MG/DL
CHOLEST/HDLC SERPL: 3 {RATIO} (ref 0–5)
DEPRECATED CALCIDIOL+CALCIFEROL SERPL-MC: 24.5 NG/ML (ref 30–80)
ERYTHROCYTE [DISTWIDTH] IN BLOOD BY AUTOMATED COUNT: 14.2 % (ref 11.5–17)
EST. AVERAGE GLUCOSE BLD GHB EST-MCNC: 122.6 MG/DL
ESTRADIOL SERPL HS-MCNC: <24 PG/ML
FERRITIN SERPL-MCNC: 30.97 NG/ML (ref 4.63–204)
FSH SERPL-ACNC: 90.22 MIU/ML
HBA1C MFR BLD: 5.9 %
HCT VFR BLD AUTO: 37.4 % (ref 37–47)
HDLC SERPL-MCNC: 60 MG/DL (ref 35–60)
HGB BLD-MCNC: 11.8 G/DL (ref 12–16)
INSULIN SERPL-MCNC: 10.5 UU/ML
IRON SATN MFR SERPL: 39 % (ref 20–50)
IRON SERPL-MCNC: 115 UG/DL (ref 50–170)
LDLC SERPL CALC-MCNC: 131 MG/DL (ref 50–140)
MCH RBC QN AUTO: 29.3 PG (ref 27–31)
MCHC RBC AUTO-ENTMCNC: 31.6 G/DL (ref 33–36)
MCV RBC AUTO: 92.8 FL (ref 80–94)
NRBC BLD AUTO-RTO: 0 %
PLATELET # BLD AUTO: 236 X10(3)/MCL (ref 130–400)
PMV BLD AUTO: 9.9 FL (ref 7.4–10.4)
PROGEST SERPL-MCNC: <0.5 NG/ML
RBC # BLD AUTO: 4.03 X10(6)/MCL (ref 4.2–5.4)
T3FREE SERPL-MCNC: 2.37 PG/ML (ref 1.57–3.91)
T4 FREE SERPL-MCNC: 0.78 NG/DL (ref 0.7–1.48)
TESTOST SERPL-MCNC: 20.7 NG/DL (ref 12.4–35.75)
TIBC SERPL-MCNC: 181 UG/DL (ref 70–310)
TIBC SERPL-MCNC: 296 UG/DL (ref 250–450)
TRIGL SERPL-MCNC: 87 MG/DL (ref 37–140)
TSH SERPL-ACNC: 2.48 UIU/ML (ref 0.35–4.94)
VIT B12 SERPL-MCNC: 828 PG/ML (ref 213–816)
VLDLC SERPL CALC-MCNC: 17 MG/DL
WBC # SPEC AUTO: 5.09 X10(3)/MCL (ref 4.5–11.5)

## 2023-05-22 PROCEDURE — 84460 ALANINE AMINO (ALT) (SGPT): CPT

## 2023-05-22 PROCEDURE — 84075 ASSAY ALKALINE PHOSPHATASE: CPT

## 2023-05-22 PROCEDURE — 80061 LIPID PANEL: CPT

## 2023-05-22 PROCEDURE — 82306 VITAMIN D 25 HYDROXY: CPT

## 2023-05-22 PROCEDURE — 84481 FREE ASSAY (FT-3): CPT

## 2023-05-22 PROCEDURE — 83001 ASSAY OF GONADOTROPIN (FSH): CPT

## 2023-05-22 PROCEDURE — 82670 ASSAY OF TOTAL ESTRADIOL: CPT

## 2023-05-22 PROCEDURE — 84450 TRANSFERASE (AST) (SGOT): CPT

## 2023-05-22 PROCEDURE — 83036 HEMOGLOBIN GLYCOSYLATED A1C: CPT

## 2023-05-22 PROCEDURE — 83525 ASSAY OF INSULIN: CPT

## 2023-05-22 PROCEDURE — 84403 ASSAY OF TOTAL TESTOSTERONE: CPT

## 2023-05-22 PROCEDURE — 82728 ASSAY OF FERRITIN: CPT

## 2023-05-22 PROCEDURE — 84144 ASSAY OF PROGESTERONE: CPT

## 2023-05-22 PROCEDURE — 85027 COMPLETE CBC AUTOMATED: CPT

## 2023-05-22 PROCEDURE — 36415 COLL VENOUS BLD VENIPUNCTURE: CPT

## 2023-05-22 PROCEDURE — 83550 IRON BINDING TEST: CPT

## 2023-05-22 PROCEDURE — 82607 VITAMIN B-12: CPT

## 2023-05-22 PROCEDURE — 84439 ASSAY OF FREE THYROXINE: CPT

## 2023-05-22 PROCEDURE — 84443 ASSAY THYROID STIM HORMONE: CPT

## 2023-05-31 ENCOUNTER — PATIENT OUTREACH (OUTPATIENT)
Dept: ADMINISTRATIVE | Facility: HOSPITAL | Age: 63
End: 2023-05-31
Payer: COMMERCIAL

## 2023-05-31 NOTE — PROGRESS NOTES
Population Health. Out Reach. Reviewing patient's chart for quality metrics. I attempted to contact patient to see if she has had a recent DM eye exam. No answer. Left Message,

## 2023-05-31 NOTE — PROGRESS NOTES
Patient returned my call and reported that she just had an eye exam at [facility]. Records request sent.

## 2023-06-01 ENCOUNTER — PATIENT OUTREACH (OUTPATIENT)
Dept: ADMINISTRATIVE | Facility: HOSPITAL | Age: 63
End: 2023-06-01
Payer: COMMERCIAL

## 2023-06-01 NOTE — PROGRESS NOTES
The following record(s)  below were uploaded for Health Maintenance .    DM EYE EXAM  02/23/2023

## 2023-06-21 ENCOUNTER — TELEPHONE (OUTPATIENT)
Dept: FAMILY MEDICINE | Facility: CLINIC | Age: 63
End: 2023-06-21
Payer: COMMERCIAL

## 2023-06-21 NOTE — TELEPHONE ENCOUNTER
----- Message from April Stewart sent at 6/21/2023  1:48 PM CDT -----  Regarding: med advice  .Type:  Needs Medical Advice    Who Called: patient  Symptoms (please be specific):    How long has patient had these symptoms:    Pharmacy name and phone #:    Would the patient rather a call back or a response via [facility]? Call back  Best Call Back Number:  [phone]  Additional Information:  patient said that her GYN  suggested she be placed on semaglutide for diet intake and cholesterol and wanted to know if it could be prescribed. Please advise.

## 2023-06-26 ENCOUNTER — TELEPHONE (OUTPATIENT)
Dept: FAMILY MEDICINE | Facility: CLINIC | Age: 63
End: 2023-06-26
Payer: COMMERCIAL

## 2023-06-26 DIAGNOSIS — E11.9 TYPE 2 DIABETES MELLITUS WITHOUT COMPLICATION, WITHOUT LONG-TERM CURRENT USE OF INSULIN: Primary | ICD-10-CM

## 2023-06-26 RX ORDER — SEMAGLUTIDE 0.68 MG/ML
0.25 INJECTION, SOLUTION SUBCUTANEOUS
Qty: 2 ML | Refills: 0 | Status: SHIPPED | OUTPATIENT
Start: 2023-06-26 | End: 2023-09-13 | Stop reason: SDUPTHER

## 2023-06-26 NOTE — TELEPHONE ENCOUNTER
Patient said that her GYN  suggested she be placed on semaglutide for diet intake and cholesterol and wanted to know if it could be prescribed. Also would like to know if we would administer her first few shots for her

## 2023-06-26 NOTE — TELEPHONE ENCOUNTER
In the setting of NIDDMII, can prescribe Ozempic. Starting dose for Ozempic is 0.25mg weekly. Once patient picks up the prescription, she can contact our clinic to schedule a nurse visit or schedule with Diabetes Education for medication administration education.    I have ordered the following medication. Please notify the patient.    Medications Ordered This Encounter   Medications    semaglutide (OZEMPIC) 0.25 mg or 0.5 mg (2 mg/3 mL) pen injector     Sig: Inject 0.25 mg into the skin every 7 days.     Dispense:  2 mL     Refill:  0

## 2023-07-06 ENCOUNTER — TELEPHONE (OUTPATIENT)
Dept: FAMILY MEDICINE | Facility: CLINIC | Age: 63
End: 2023-07-06
Payer: COMMERCIAL

## 2023-07-06 NOTE — TELEPHONE ENCOUNTER
[email or web address]   Patient can go to this website and sign up for the coupon card.

## 2023-07-06 NOTE — TELEPHONE ENCOUNTER
----- Message from Dixon Byrd sent at 6/29/2023  4:00 PM CDT -----  .Type:  Needs Medical Advice    Who Called: pt  Would the patient rather a call back or a response via [facility]? Call back  Best Call Back Number: [phone]   Additional Information: pt called and stated pharmacy was suppose to have been trying to contact the office to discuss alternative to semaglutide (OZEMPIC) 0.25 mg or 0.5 mg (2 mg/3 mL) pen injector

## 2023-07-06 NOTE — TELEPHONE ENCOUNTER
----- Message from Carolina Cevallos sent at 7/5/2023 12:17 PM CDT -----  Regarding: Meds  .Type:  Needs Medical Advice    Who Called: Pt  Symptoms (please be specific):    How long has patient had these symptoms:    Pharmacy name and phone #:    Would the patient rather a call back or a response via [facility]? Call back  Best Call Back Number: [phone]  Additional Information: Insurance won't cover OZEMPIC, wants to know what could be called in in it's place.

## 2023-07-17 ENCOUNTER — HOSPITAL ENCOUNTER (OUTPATIENT)
Dept: RADIOLOGY | Facility: CLINIC | Age: 63
Discharge: HOME | End: 2023-07-17
Attending: REHABILITATION UNIT
Payer: COMMERCIAL

## 2023-07-17 ENCOUNTER — OFFICE VISIT (OUTPATIENT)
Dept: ORTHOPEDICS | Facility: CLINIC | Age: 63
End: 2023-07-17
Payer: COMMERCIAL

## 2023-07-17 VITALS — WEIGHT: 190 LBS | BODY MASS INDEX: 31.65 KG/M2 | HEIGHT: 65 IN

## 2023-07-17 DIAGNOSIS — M25.511 ACUTE PAIN OF RIGHT SHOULDER: ICD-10-CM

## 2023-07-17 DIAGNOSIS — M75.21 BICEPS TENDINITIS OF RIGHT UPPER EXTREMITY: Primary | ICD-10-CM

## 2023-07-17 DIAGNOSIS — M25.531 RIGHT WRIST PAIN: ICD-10-CM

## 2023-07-17 DIAGNOSIS — S63.501A SPRAIN OF RIGHT WRIST, INITIAL ENCOUNTER: ICD-10-CM

## 2023-07-17 PROCEDURE — 3044F HG A1C LEVEL LT 7.0%: CPT | Mod: CPTII,,, | Performed by: REHABILITATION UNIT

## 2023-07-17 PROCEDURE — 3008F PR BODY MASS INDEX (BMI) DOCUMENTED: ICD-10-PCS | Mod: CPTII,,, | Performed by: REHABILITATION UNIT

## 2023-07-17 PROCEDURE — 99204 OFFICE O/P NEW MOD 45 MIN: CPT | Mod: ,,, | Performed by: REHABILITATION UNIT

## 2023-07-17 PROCEDURE — 73110 XR WRIST COMPLETE 3 VIEWS RIGHT: ICD-10-PCS | Mod: RT,,, | Performed by: REHABILITATION UNIT

## 2023-07-17 PROCEDURE — 4010F PR ACE/ARB THEARPY RXD/TAKEN: ICD-10-PCS | Mod: CPTII,,, | Performed by: REHABILITATION UNIT

## 2023-07-17 PROCEDURE — 1159F MED LIST DOCD IN RCRD: CPT | Mod: CPTII,,, | Performed by: REHABILITATION UNIT

## 2023-07-17 PROCEDURE — 73030 XR SHOULDER COMPLETE 2 OR MORE VIEWS RIGHT: ICD-10-PCS | Mod: RT,,, | Performed by: REHABILITATION UNIT

## 2023-07-17 PROCEDURE — 4010F ACE/ARB THERAPY RXD/TAKEN: CPT | Mod: CPTII,,, | Performed by: REHABILITATION UNIT

## 2023-07-17 PROCEDURE — 3044F PR MOST RECENT HEMOGLOBIN A1C LEVEL <7.0%: ICD-10-PCS | Mod: CPTII,,, | Performed by: REHABILITATION UNIT

## 2023-07-17 PROCEDURE — 73030 X-RAY EXAM OF SHOULDER: CPT | Mod: RT,,, | Performed by: REHABILITATION UNIT

## 2023-07-17 PROCEDURE — 99204 PR OFFICE/OUTPT VISIT, NEW, LEVL IV, 45-59 MIN: ICD-10-PCS | Mod: ,,, | Performed by: REHABILITATION UNIT

## 2023-07-17 PROCEDURE — 3008F BODY MASS INDEX DOCD: CPT | Mod: CPTII,,, | Performed by: REHABILITATION UNIT

## 2023-07-17 PROCEDURE — 1159F PR MEDICATION LIST DOCUMENTED IN MEDICAL RECORD: ICD-10-PCS | Mod: CPTII,,, | Performed by: REHABILITATION UNIT

## 2023-07-17 PROCEDURE — 73110 X-RAY EXAM OF WRIST: CPT | Mod: RT,,, | Performed by: REHABILITATION UNIT

## 2023-07-17 RX ORDER — METHYLPREDNISOLONE 4 MG/1
TABLET ORAL
Qty: 21 EACH | Refills: 0 | Status: SHIPPED | OUTPATIENT
Start: 2023-07-17 | End: 2024-02-27

## 2023-07-17 RX ORDER — DICLOFENAC SODIUM 10 MG/G
2 GEL TOPICAL 4 TIMES DAILY
Qty: 100 G | Refills: 0 | Status: SHIPPED | OUTPATIENT
Start: 2023-07-17

## 2023-07-17 NOTE — PROGRESS NOTES
Subjective:      Patient ID: Jenny Epps is a 63 y.o. female.    Chief Complaint: Shoulder Pain (right shoulder pain from fall in march. xr done today) and Wrist Pain (right wrist pain from fall in march. xr done today.)    HPI:   Jenny Epps is a 63 y.o. female who presents today for initial evaluation of her right wrist and her right shoulder.  She fell playing tennis in March.  She had pain to her right wrist and shoulder.  This improved but did returned.  She is right-hand dominant.  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  Pain is localized to the radial aspect of the wrist and anterior aspect of the shoulder.  She has taken Tylenol.  She has not done any therapy or injections.  Pain is worse with movement and activity.  She is quite active.  No prior injury or issues reported.    Past Medical History:   Diagnosis Date    Anxiety     Depression     GERD (gastroesophageal reflux disease)     Hypertension     Personal history of colonic polyps 2018     Past Surgical History:   Procedure Laterality Date     SECTION      COLONOSCOPY  2018    SPINE SURGERY  1998     Social History     Socioeconomic History    Marital status:    Tobacco Use    Smoking status: Former     Current packs/day: 0.25     Average packs/day: 0.3 packs/day for 5.0 years (1.3 ttl pk-yrs)     Types: Cigarettes    Smokeless tobacco: Never   Substance and Sexual Activity    Alcohol use: Yes     Alcohol/week: 2.0 standard drinks of alcohol     Types: 2 Shots of liquor per week    Drug use: Never    Sexual activity: Not Currently     Partners: Male     Birth control/protection: None         Current Outpatient Medications:     EScitalopram oxalate (LEXAPRO) 10 MG tablet, Take 10 mg by mouth once daily., Disp: , Rfl:     nystatin-triamcinolone (MYCOLOG II) cream, Apply topically 3 (three) times daily., Disp: , Rfl:     olmesartan (BENICAR) 20 MG tablet, , Disp: , Rfl:     olmesartan-hydrochlorothiazide (BENICAR  "HCT) 20-12.5 mg per tablet, TAKE 1 TABLET BY MOUTH EVERY DAY, Disp: 30 tablet, Rfl: 5    pantoprazole (PROTONIX) 40 MG tablet, Take 40 mg by mouth every morning., Disp: , Rfl:     semaglutide (OZEMPIC) 0.25 mg or 0.5 mg (2 mg/3 mL) pen injector, Inject 0.25 mg into the skin every 7 days., Disp: 2 mL, Rfl: 0    sucralfate (CARAFATE) 1 gram tablet, Take 1 g by mouth 3 (three) times daily., Disp: , Rfl:     diclofenac sodium (VOLTAREN) 1 % Gel, Apply 2 g topically 4 (four) times daily., Disp: 100 g, Rfl: 0    methylPREDNISolone (MEDROL DOSEPACK) 4 mg tablet, use as directed, Disp: 21 each, Rfl: 0  Review of patient's allergies indicates:   Allergen Reactions    Codeine Hives    Opioids - morphine analogues Hives    Tylox [oxycodone-acetaminophen] Hives     BP increases.       Ht 5' 5" (1.651 m)   Wt 86.2 kg (190 lb)   BMI 31.62 kg/m²     Comprehensive review of systems completed and negative except as per HPI.        Objective:   Head: Normocephalic, without obvious abnormality, atraumatic  Eyes: conjunctivae/corneas clear. EOM's intact  Ears: normal external appearance  Nose: Nares normal. Septum midline. Mucosa normal. No drainage  Throat: normal findings: lips normal without lesions  Lungs: unlabored breathing on room air  Chest wall: symmetric chest rise  Heart: regular rate and rhythm  Pulses: 2+ and symmetric  Skin: Skin color, texture, turgor normal. No rashes or lesions  Neurologic: Grossly normal    RUE    Appearance:   Normal; skin is intact with no lesions or significant soft tissue swelling    Cervical Spine:   No ttp; full, painless ROM; negative Spurlings    Tenderness:   Anteriorly within the bicipital groove and minimally about the wrist    ROM:   Full motion of the shoulder and wrist with some discomfort at the extremes    Pain:  AROM: Positive  PROM:  Negative  End ROM: Positive  Supraspinatus strength testing: Negative  External rotation strength testing: Negative  Tanika-scapular: " Negative  Virtually all provacative maneuvers Negative    Strength:  Supraspinatus: intact  External rotation: intact  Tanika-scapular: intact    Provocative Maneuvers:     Rotator Cuff/Biceps/AC Joint  Neer's Sign: Positive  Hawkin's Test: Positive  Painful arc: Positive  Belly Press: Negative  Bear Hugger Test: Negative  Hornblower's Sign: Negative  Speed's Test: +  Yergeson's Test: Positive  Cross Arm Abduction: Positive    Pulses: Palpable radial pulse    Neurological deficits: None    The patient has a warm and well-perfused upper extremity with capillary refill less than 2 seconds. Sensation is intact to light touch in terminal nerve distributions. 5/5 ain/pin/uln. The patient has no palpable epitrochlear lymphadenopathy.      Assessment:     Imaging:   Four view radiographs of the right shoulder obtained today personally reviewed showing no acute fractures or dislocations.  Joint spaces are well maintained.  Humeral head remains seated centrally within the glenoid.  There is some narrowing of the acromiaohumeral interval.     Three-view radiographs of the right wrist obtained today personally reviewed showing no acute fractures or dislocations.  Visualized joint spaces are intact.        1. Biceps tendinitis of right upper extremity    2. Acute pain of right shoulder    3. Right wrist pain    4. Sprain of right wrist, initial encounter          Plan:       Orders Placed This Encounter    X-ray Shoulder 2 or More Views Right    X-Ray Wrist Complete Right    Ambulatory referral/consult to Physical/Occupational Therapy    methylPREDNISolone (MEDROL DOSEPACK) 4 mg tablet    diclofenac sodium (VOLTAREN) 1 % Gel     Imaging and exam findings discussed with the patient.  She had a traumatic injury few months ago.  She is persistent shoulder and wrist pain.  We discussed her options.  In order to treat both these she elected to proceed with a steroid Dosepak.  She localizes very well to the biceps tendon for her  shoulder and more diffuse about the wrist.  We will also provide some Voltaren gel for topical application in spots that are bothersome for her.  I will refer her to physical therapy at [facility] in [place].  She will follow up with me as needed if she has any persistent issues.  All of her questions were answered and she was happy and in agreement with the plan.

## 2023-07-19 ENCOUNTER — TELEPHONE (OUTPATIENT)
Dept: FAMILY MEDICINE | Facility: CLINIC | Age: 63
End: 2023-07-19
Payer: COMMERCIAL

## 2023-07-19 ENCOUNTER — PATIENT MESSAGE (OUTPATIENT)
Dept: FAMILY MEDICINE | Facility: CLINIC | Age: 63
End: 2023-07-19
Payer: COMMERCIAL

## 2023-07-19 NOTE — TELEPHONE ENCOUNTER
----- Message from Missy Espinosa sent at 7/19/2023 11:19 AM CDT -----  Regarding: prior auth/ and parth code  .Type:  RX Refill Request    Who Called: pt  Refill or New Rx:refill  RX Name and Strength:semaglutide (OZEMPIC) 0.25 mg or 0.5 mg   How is the patient currently taking it? (ex. 1XDay):Inject 0.25 mg into the skin every 7 days  Is this a 30 day or 90 day RX:  Preferred Pharmacy with phone number:[facility]/PHARMACY #[id number]  [place] - [address]  Local or Mail Ordr:local  Ordering Provider:  Would the patient rather a call back or a response via [facility]? cb  Best Call Back Number:  Additional Information: diagnosis code and prior auth

## 2023-07-20 ENCOUNTER — TELEPHONE (OUTPATIENT)
Dept: FAMILY MEDICINE | Facility: CLINIC | Age: 63
End: 2023-07-20
Payer: COMMERCIAL

## 2023-07-31 ENCOUNTER — CLINICAL SUPPORT (OUTPATIENT)
Dept: FAMILY MEDICINE | Facility: CLINIC | Age: 63
End: 2023-07-31
Payer: COMMERCIAL

## 2023-07-31 NOTE — PROGRESS NOTES
Pt presented to clinic for education for med admin on Ozempic  Pt stated that she is confident she can administer medication herself   Instructed pt on how to admin medication  Pt verbalized understanding  0.25mg Ozempic given L thigh in SubQ area  Pt tolerated well  No complications  Advised pt to monitor inj site for possible reaction  Also advised pt to call us with any questions or concerns  Expressed understanding

## 2023-09-07 ENCOUNTER — PATIENT MESSAGE (OUTPATIENT)
Dept: RESEARCH | Facility: HOSPITAL | Age: 63
End: 2023-09-07
Payer: COMMERCIAL

## 2023-09-08 ENCOUNTER — TELEPHONE (OUTPATIENT)
Dept: ORTHOPEDICS | Facility: CLINIC | Age: 63
End: 2023-09-08
Payer: COMMERCIAL

## 2023-09-08 DIAGNOSIS — M25.531 RIGHT WRIST PAIN: Primary | ICD-10-CM

## 2023-09-08 NOTE — TELEPHONE ENCOUNTER
[facility] called patient is wanting to go back in to therapy for wrist. Therapy is needing new script sent to get patient in. Was able to get a new script faxed over to [facility].

## 2023-09-13 ENCOUNTER — TELEPHONE (OUTPATIENT)
Dept: FAMILY MEDICINE | Facility: CLINIC | Age: 63
End: 2023-09-13
Payer: COMMERCIAL

## 2023-09-13 DIAGNOSIS — E11.9 TYPE 2 DIABETES MELLITUS WITHOUT COMPLICATION, WITHOUT LONG-TERM CURRENT USE OF INSULIN: Primary | ICD-10-CM

## 2023-09-13 RX ORDER — SEMAGLUTIDE 0.68 MG/ML
0.25 INJECTION, SOLUTION SUBCUTANEOUS
Qty: 2 ML | Refills: 2 | Status: SHIPPED | OUTPATIENT
Start: 2023-09-13 | End: 2024-02-06 | Stop reason: SDUPTHER

## 2023-09-13 NOTE — TELEPHONE ENCOUNTER
----- Message from Carolina Cevallos sent at 9/13/2023 11:09 AM CDT -----  Regarding: med refill  .Type:  RX Refill Request    Who Called: Pt  Refill or New Rx:Refill  RX Name and Strength:semaglutide (OZEMPIC) 0.25 mg or 0.5 mg (2 mg/3 mL) pen injector  How is the patient currently taking it? (ex. 1XDay):  Is this a 30 day or 90 day RX:  Preferred Pharmacy with phone number:[facility]/PHARMACY #[id number] - [place], [place] - [address], [address] [facility]  Local or Mail Order:Local  Ordering Provider:Nely  Would the patient rather a call back or a response via [facility]? Call back  Best Call Back Number:[phone]  Additional Information: Would like cont the same dosage. Pt is completely out.

## 2023-10-22 ENCOUNTER — PATIENT MESSAGE (OUTPATIENT)
Dept: FAMILY MEDICINE | Facility: CLINIC | Age: 63
End: 2023-10-22
Payer: COMMERCIAL

## 2023-10-22 ENCOUNTER — PATIENT MESSAGE (OUTPATIENT)
Dept: ADMINISTRATIVE | Facility: OTHER | Age: 63
End: 2023-10-22
Payer: COMMERCIAL

## 2023-10-22 DIAGNOSIS — D53.9 MACROCYTIC ANEMIA: Primary | ICD-10-CM

## 2023-10-22 DIAGNOSIS — E78.2 MIXED HYPERLIPIDEMIA: ICD-10-CM

## 2023-10-22 DIAGNOSIS — E11.9 TYPE 2 DIABETES MELLITUS WITHOUT COMPLICATION, WITHOUT LONG-TERM CURRENT USE OF INSULIN: ICD-10-CM

## 2023-10-22 DIAGNOSIS — I10 PRIMARY HYPERTENSION: ICD-10-CM

## 2023-10-23 NOTE — TELEPHONE ENCOUNTER
I have ordered the following labs. Please notify the patient.    Orders Placed This Encounter   Procedures    Comprehensive Metabolic Panel     Standing Status:   Future     Standing Expiration Date:   1/23/2024    CBC Auto Differential     Standing Status:   Future     Standing Expiration Date:   1/23/2024    Lipid Panel     Standing Status:   Future     Standing Expiration Date:   1/23/2024    Hemoglobin A1C     Standing Status:   Future     Standing Expiration Date:   1/23/2024    Ferritin     Standing Status:   Future     Standing Expiration Date:   12/21/2024    Iron and TIBC     Standing Status:   Future     Standing Expiration Date:   12/21/2024    TSH     Standing Status:   Future     Standing Expiration Date:   1/23/2024    Path Review, Peripheral Smear     Standing Status:   Future     Standing Expiration Date:   12/21/2024

## 2023-10-25 ENCOUNTER — LAB VISIT (OUTPATIENT)
Dept: LAB | Facility: HOSPITAL | Age: 63
End: 2023-10-25
Attending: STUDENT IN AN ORGANIZED HEALTH CARE EDUCATION/TRAINING PROGRAM
Payer: COMMERCIAL

## 2023-10-25 DIAGNOSIS — E11.9 TYPE 2 DIABETES MELLITUS WITHOUT COMPLICATION, WITHOUT LONG-TERM CURRENT USE OF INSULIN: ICD-10-CM

## 2023-10-25 DIAGNOSIS — D53.9 MACROCYTIC ANEMIA: ICD-10-CM

## 2023-10-25 DIAGNOSIS — I10 PRIMARY HYPERTENSION: ICD-10-CM

## 2023-10-25 DIAGNOSIS — E78.2 MIXED HYPERLIPIDEMIA: ICD-10-CM

## 2023-10-25 LAB
ALBUMIN SERPL-MCNC: 4.2 G/DL (ref 3.4–4.8)
ALBUMIN/GLOB SERPL: 1.3 RATIO (ref 1.1–2)
ALP SERPL-CCNC: 76 UNIT/L (ref 40–150)
ALT SERPL-CCNC: 12 UNIT/L (ref 0–55)
AST SERPL-CCNC: 19 UNIT/L (ref 5–34)
BASOPHILS # BLD AUTO: 0.03 X10(3)/MCL
BASOPHILS NFR BLD AUTO: 0.6 %
BILIRUB SERPL-MCNC: 0.6 MG/DL
BUN SERPL-MCNC: 13 MG/DL (ref 9.8–20.1)
CALCIUM SERPL-MCNC: 9.8 MG/DL (ref 8.4–10.2)
CHLORIDE SERPL-SCNC: 104 MMOL/L (ref 98–107)
CHOLEST SERPL-MCNC: 240 MG/DL
CHOLEST/HDLC SERPL: 4 {RATIO} (ref 0–5)
CO2 SERPL-SCNC: 28 MMOL/L (ref 23–31)
CREAT SERPL-MCNC: 1 MG/DL (ref 0.55–1.02)
EOSINOPHIL # BLD AUTO: 0.12 X10(3)/MCL (ref 0–0.9)
EOSINOPHIL NFR BLD AUTO: 2.4 %
ERYTHROCYTE [DISTWIDTH] IN BLOOD BY AUTOMATED COUNT: 14.6 % (ref 11.5–17)
EST. AVERAGE GLUCOSE BLD GHB EST-MCNC: 114 MG/DL
FERRITIN SERPL-MCNC: 52.55 NG/ML (ref 4.63–204)
GFR SERPLBLD CREATININE-BSD FMLA CKD-EPI: >60 MLS/MIN/1.73/M2
GLOBULIN SER-MCNC: 3.2 GM/DL (ref 2.4–3.5)
GLUCOSE SERPL-MCNC: 95 MG/DL (ref 82–115)
HBA1C MFR BLD: 5.6 %
HCT VFR BLD AUTO: 39.1 % (ref 37–47)
HDLC SERPL-MCNC: 59 MG/DL (ref 35–60)
HEMATOLOGIST REVIEW: NORMAL
HGB BLD-MCNC: 12.7 G/DL (ref 12–16)
IMM GRANULOCYTES # BLD AUTO: 0.02 X10(3)/MCL (ref 0–0.04)
IMM GRANULOCYTES NFR BLD AUTO: 0.4 %
IRON SATN MFR SERPL: 36 % (ref 20–50)
IRON SERPL-MCNC: 107 UG/DL (ref 50–170)
LDLC SERPL CALC-MCNC: 162 MG/DL (ref 50–140)
LYMPHOCYTES # BLD AUTO: 2.28 X10(3)/MCL (ref 0.6–4.6)
LYMPHOCYTES NFR BLD AUTO: 44.7 %
MCH RBC QN AUTO: 29.7 PG (ref 27–31)
MCHC RBC AUTO-ENTMCNC: 32.5 G/DL (ref 33–36)
MCV RBC AUTO: 91.4 FL (ref 80–94)
MONOCYTES # BLD AUTO: 0.55 X10(3)/MCL (ref 0.1–1.3)
MONOCYTES NFR BLD AUTO: 10.8 %
NEUTROPHILS # BLD AUTO: 2.1 X10(3)/MCL (ref 2.1–9.2)
NEUTROPHILS NFR BLD AUTO: 41.1 %
NRBC BLD AUTO-RTO: 0 %
PLATELET # BLD AUTO: 281 X10(3)/MCL (ref 130–400)
PMV BLD AUTO: 9.9 FL (ref 7.4–10.4)
POTASSIUM SERPL-SCNC: 4 MMOL/L (ref 3.5–5.1)
PROT SERPL-MCNC: 7.4 GM/DL (ref 5.8–7.6)
RBC # BLD AUTO: 4.28 X10(6)/MCL (ref 4.2–5.4)
SODIUM SERPL-SCNC: 140 MMOL/L (ref 136–145)
TIBC SERPL-MCNC: 191 UG/DL (ref 70–310)
TIBC SERPL-MCNC: 298 UG/DL (ref 250–450)
TRANSFERRIN SERPL-MCNC: 256 MG/DL (ref 173–360)
TRIGL SERPL-MCNC: 94 MG/DL (ref 37–140)
TSH SERPL-ACNC: 2.87 UIU/ML (ref 0.35–4.94)
VLDLC SERPL CALC-MCNC: 19 MG/DL
WBC # SPEC AUTO: 5.1 X10(3)/MCL (ref 4.5–11.5)

## 2023-10-25 PROCEDURE — 80061 LIPID PANEL: CPT

## 2023-10-25 PROCEDURE — 83036 HEMOGLOBIN GLYCOSYLATED A1C: CPT

## 2023-10-25 PROCEDURE — 85025 COMPLETE CBC W/AUTO DIFF WBC: CPT

## 2023-10-25 PROCEDURE — 82728 ASSAY OF FERRITIN: CPT

## 2023-10-25 PROCEDURE — 83550 IRON BINDING TEST: CPT

## 2023-10-25 PROCEDURE — 80053 COMPREHEN METABOLIC PANEL: CPT

## 2023-10-25 PROCEDURE — 84443 ASSAY THYROID STIM HORMONE: CPT

## 2023-10-25 PROCEDURE — 36415 COLL VENOUS BLD VENIPUNCTURE: CPT

## 2023-11-02 ENCOUNTER — TELEPHONE (OUTPATIENT)
Dept: FAMILY MEDICINE | Facility: CLINIC | Age: 63
End: 2023-11-02
Payer: COMMERCIAL

## 2023-11-02 NOTE — TELEPHONE ENCOUNTER
Patient notified of results of blood tests from 10.25.23. She stated she doesn't want to start a statin. She started on ozempic for weight loss and wanted to see if that helps. She wants to try diet and exercise. She does see a cardiologist.     Appt. Scheduled with Dr. Mckay for December.      Ynes is asking what a normal ascvd score is for her age. Please advise. Kisha

## 2023-11-02 NOTE — TELEPHONE ENCOUNTER
----- Message from Dara Carrillo sent at 11/2/2023  3:32 PM CDT -----  .Type:  Test Results    Who Called: pt   Name of Test (Lab/Mammo/Etc): ingrid   Date of Test:   Ordering Provider: Nely   Where the test was performed:   Would the patient rather a call back or a response via [facility]? Call back   Best Call Back Number: [phone]  Additional Information:  Pt states that the she had a missed call from the office forlab results.Called the back line nurse was seeing pt.

## 2023-11-03 NOTE — TELEPHONE ENCOUNTER
Patient advised regarding risk score and voiced understanding. She will notify us if she wants to be placed on rx. She is aware her risk score is 18.6%. Kisha

## 2023-12-15 DIAGNOSIS — F41.9 ANXIETY AND DEPRESSION: ICD-10-CM

## 2023-12-15 DIAGNOSIS — F32.A ANXIETY AND DEPRESSION: ICD-10-CM

## 2023-12-15 RX ORDER — ESCITALOPRAM OXALATE 5 MG/1
5 TABLET ORAL
Qty: 90 TABLET | Refills: 4 | Status: SHIPPED | OUTPATIENT
Start: 2023-12-15 | End: 2024-02-27 | Stop reason: DRUGHIGH

## 2024-02-02 ENCOUNTER — LAB VISIT (OUTPATIENT)
Dept: LAB | Facility: HOSPITAL | Age: 64
End: 2024-02-02
Attending: STUDENT IN AN ORGANIZED HEALTH CARE EDUCATION/TRAINING PROGRAM
Payer: COMMERCIAL

## 2024-02-02 DIAGNOSIS — E78.2 MIXED HYPERLIPIDEMIA: ICD-10-CM

## 2024-02-02 DIAGNOSIS — E11.9 TYPE 2 DIABETES MELLITUS WITHOUT COMPLICATION, WITHOUT LONG-TERM CURRENT USE OF INSULIN: ICD-10-CM

## 2024-02-02 LAB
CHOLEST SERPL-MCNC: 234 MG/DL
CHOLEST/HDLC SERPL: 4 {RATIO} (ref 0–5)
CREAT UR-MCNC: 170 MG/DL (ref 45–106)
EST. AVERAGE GLUCOSE BLD GHB EST-MCNC: 119.8 MG/DL
HBA1C MFR BLD: 5.8 %
HDLC SERPL-MCNC: 63 MG/DL (ref 35–60)
LDLC SERPL CALC-MCNC: 158 MG/DL (ref 50–140)
MICROALBUMIN UR-MCNC: 6.4 UG/ML
MICROALBUMIN/CREAT RATIO PNL UR: 3.8 MG/GM CR (ref 0–30)
TRIGL SERPL-MCNC: 66 MG/DL (ref 37–140)
VLDLC SERPL CALC-MCNC: 13 MG/DL

## 2024-02-02 PROCEDURE — 80061 LIPID PANEL: CPT

## 2024-02-02 PROCEDURE — 82043 UR ALBUMIN QUANTITATIVE: CPT

## 2024-02-02 PROCEDURE — 83036 HEMOGLOBIN GLYCOSYLATED A1C: CPT

## 2024-02-02 PROCEDURE — 36415 COLL VENOUS BLD VENIPUNCTURE: CPT

## 2024-02-05 ENCOUNTER — TELEPHONE (OUTPATIENT)
Dept: FAMILY MEDICINE | Facility: CLINIC | Age: 64
End: 2024-02-05

## 2024-02-06 DIAGNOSIS — E11.9 TYPE 2 DIABETES MELLITUS WITHOUT COMPLICATION, WITHOUT LONG-TERM CURRENT USE OF INSULIN: Primary | ICD-10-CM

## 2024-02-06 DIAGNOSIS — E78.2 MIXED HYPERLIPIDEMIA: ICD-10-CM

## 2024-02-06 RX ORDER — SEMAGLUTIDE 0.68 MG/ML
0.25 INJECTION, SOLUTION SUBCUTANEOUS
Qty: 3 ML | Refills: 6 | Status: SHIPPED | OUTPATIENT
Start: 2024-02-06

## 2024-02-27 ENCOUNTER — OFFICE VISIT (OUTPATIENT)
Dept: FAMILY MEDICINE | Facility: CLINIC | Age: 64
End: 2024-02-27
Payer: COMMERCIAL

## 2024-02-27 VITALS
HEIGHT: 65 IN | WEIGHT: 179.31 LBS | DIASTOLIC BLOOD PRESSURE: 74 MMHG | HEART RATE: 66 BPM | TEMPERATURE: 98 F | OXYGEN SATURATION: 98 % | SYSTOLIC BLOOD PRESSURE: 116 MMHG | BODY MASS INDEX: 29.87 KG/M2 | RESPIRATION RATE: 16 BRPM

## 2024-02-27 DIAGNOSIS — F32.A ANXIETY AND DEPRESSION: ICD-10-CM

## 2024-02-27 DIAGNOSIS — K21.9 GASTROESOPHAGEAL REFLUX DISEASE, UNSPECIFIED WHETHER ESOPHAGITIS PRESENT: ICD-10-CM

## 2024-02-27 DIAGNOSIS — E11.9 TYPE 2 DIABETES MELLITUS WITHOUT COMPLICATION, WITHOUT LONG-TERM CURRENT USE OF INSULIN: ICD-10-CM

## 2024-02-27 DIAGNOSIS — I10 PRIMARY HYPERTENSION: ICD-10-CM

## 2024-02-27 DIAGNOSIS — F41.9 ANXIETY AND DEPRESSION: ICD-10-CM

## 2024-02-27 DIAGNOSIS — Z00.00 ANNUAL PHYSICAL EXAM: Primary | ICD-10-CM

## 2024-02-27 DIAGNOSIS — E78.2 MIXED HYPERLIPIDEMIA: ICD-10-CM

## 2024-02-27 PROBLEM — D53.9 MACROCYTIC ANEMIA: Status: RESOLVED | Noted: 2023-02-15 | Resolved: 2024-02-27

## 2024-02-27 PROCEDURE — 3078F DIAST BP <80 MM HG: CPT | Mod: CPTII,,, | Performed by: STUDENT IN AN ORGANIZED HEALTH CARE EDUCATION/TRAINING PROGRAM

## 2024-02-27 PROCEDURE — 1159F MED LIST DOCD IN RCRD: CPT | Mod: CPTII,,, | Performed by: STUDENT IN AN ORGANIZED HEALTH CARE EDUCATION/TRAINING PROGRAM

## 2024-02-27 PROCEDURE — 3008F BODY MASS INDEX DOCD: CPT | Mod: CPTII,,, | Performed by: STUDENT IN AN ORGANIZED HEALTH CARE EDUCATION/TRAINING PROGRAM

## 2024-02-27 PROCEDURE — 3061F NEG MICROALBUMINURIA REV: CPT | Mod: CPTII,,, | Performed by: STUDENT IN AN ORGANIZED HEALTH CARE EDUCATION/TRAINING PROGRAM

## 2024-02-27 PROCEDURE — 99396 PREV VISIT EST AGE 40-64: CPT | Mod: ,,, | Performed by: STUDENT IN AN ORGANIZED HEALTH CARE EDUCATION/TRAINING PROGRAM

## 2024-02-27 PROCEDURE — 3074F SYST BP LT 130 MM HG: CPT | Mod: CPTII,,, | Performed by: STUDENT IN AN ORGANIZED HEALTH CARE EDUCATION/TRAINING PROGRAM

## 2024-02-27 PROCEDURE — 3044F HG A1C LEVEL LT 7.0%: CPT | Mod: CPTII,,, | Performed by: STUDENT IN AN ORGANIZED HEALTH CARE EDUCATION/TRAINING PROGRAM

## 2024-02-27 PROCEDURE — 3066F NEPHROPATHY DOC TX: CPT | Mod: CPTII,,, | Performed by: STUDENT IN AN ORGANIZED HEALTH CARE EDUCATION/TRAINING PROGRAM

## 2024-02-27 RX ORDER — ROSUVASTATIN CALCIUM 20 MG/1
20 TABLET, COATED ORAL NIGHTLY
COMMUNITY
Start: 2024-02-05 | End: 2024-02-27 | Stop reason: ALTCHOICE

## 2024-02-27 NOTE — PROGRESS NOTES
"Subjective:      Patient ID: Jenny Epps is a 64 y.o.  female.    Chief Complaint: Wellness    Preventative Health: Patient not amenable to any vaccinations at this time. Patient following with Dr. Cee Lombardi with OB-GYN for her well-woman exam. Patient following with Dr. Gregory Zazueta with GI for her colon CA surveillance; she knows that she needs to reach out to GI for colon CA surveillance.     NIDDMII: A1c 5.8 from 02/02/24. Urine microalbumin/Cr ratio negative from 02/02/24. Patient taking Ozempic 0.5mg weekly on Mondays. Patient following with [facility] her eye exams.     HTN/HLD: Patient following with Dr. Delbert Giron with Cardiology. /74. Patient taking Olmesartan-HCTZ daily. Calcium score 2 from 05/09/22. Lipid panel positive for elevated cholesterol 234 and elevated .     GERD: Patient taking Protonix. Patient taking Carafate PRN.     Anxiety/Depression: Mood stable on Lexapro.     Review of Systems   Constitutional:  Negative for activity change, appetite change, chills, diaphoresis, fatigue, fever and unexpected weight change.   Eyes:  Negative for visual disturbance.   Respiratory:  Negative for apnea, cough, shortness of breath, wheezing and stridor.    Cardiovascular:  Negative for chest pain, palpitations and leg swelling.   Gastrointestinal:  Negative for abdominal pain, blood in stool, diarrhea, nausea and vomiting.   Genitourinary:  Negative for dysuria and hematuria.   Musculoskeletal:  Negative for arthralgias, back pain and myalgias.   Skin:  Negative for rash and wound.   Neurological:  Negative for dizziness, syncope, weakness, numbness and headaches.   Psychiatric/Behavioral:  Negative for behavioral problems, dysphoric mood and sleep disturbance. The patient is not nervous/anxious.      Objective:   /74 (BP Location: Left arm)   Pulse 66   Temp 97.8 °F (36.6 °C) (Temporal)   Resp 16   Ht 5' 5" (1.651 m)   Wt 81.3 kg (179 lb 4.8 oz)   " SpO2 98%   BMI 29.84 kg/m²     Physical Exam  Vitals and nursing note reviewed.   Constitutional:       General: She is not in acute distress.     Appearance: Normal appearance. She is not ill-appearing or toxic-appearing.   HENT:      Head: Normocephalic and atraumatic.      Mouth/Throat:      Mouth: Mucous membranes are moist.      Pharynx: Oropharynx is clear.   Eyes:      Conjunctiva/sclera: Conjunctivae normal.   Cardiovascular:      Rate and Rhythm: Normal rate and regular rhythm.      Pulses:           Dorsalis pedis pulses are 3+ on the right side and 3+ on the left side.      Heart sounds: Normal heart sounds. No murmur heard.  Pulmonary:      Effort: Pulmonary effort is normal. No respiratory distress.      Breath sounds: Normal breath sounds.   Abdominal:      General: There is no distension.      Palpations: Abdomen is soft.      Tenderness: There is no abdominal tenderness.   Musculoskeletal:         General: No deformity.      Cervical back: Neck supple. No tenderness.      Right lower leg: No edema.      Left lower leg: No edema.      Right foot: No deformity.      Left foot: No deformity.   Feet:      Right foot:      Protective Sensation:   10 sites sensed.      Skin integrity: Skin integrity normal.      Toenail Condition: Right toenails are normal.      Left foot:      Protective Sensation:   10 sites sensed.      Skin integrity: Skin integrity normal.      Toenail Condition: Left toenails are normal.   Lymphadenopathy:      Cervical: No cervical adenopathy.   Skin:     General: Skin is warm and dry.      Findings: No lesion or rash.   Neurological:      General: No focal deficit present.      Mental Status: She is alert. Mental status is at baseline.      Motor: No weakness.      Coordination: Coordination normal.   Psychiatric:         Mood and Affect: Mood normal.         Behavior: Behavior normal.         Thought Content: Thought content normal.         Judgment: Judgment normal.        Assessment/Plan:   1. Annual physical exam  Comments:  - Health maintenance reviewed.    2. Type 2 diabetes mellitus without complication, without long-term current use of insulin  Assessment & Plan:  - A1c 5.8, well-controlled.  - Urine microalbumin/Cr negative from 02/02/24.  - Continue Ozempic 0.5mg weekly.  - Patient following with [facility] for her eye exams.  - Diabetic foot exam performed.    Orders:  -     Hemoglobin A1C; Future; Expected date: 05/27/2024    3. Primary hypertension  Assessment & Plan:  - BP well-controlled.  - Patient following with Dr. Delbert Giron with Cardiology.  - Continue Olmesartan-HCTZ 20mg-12.5mg daily.      4. Mixed hyperlipidemia  Assessment & Plan:  - Lipid panel positive for elevated cholesterol 234 and elevated .  - Calcium score 2 from 05/09/22.   - Recommend statin therapy due to comorbid conditions. Patient would like to defer statin therapy at this time. Would start moderate-intensity statin if patient is amenable in the future and titrate to high-intensity if appropriate.  - Patient counseled regarding lifestyle modifications with diet and exercise.  - Continue to monitor.      5. Gastroesophageal reflux disease, unspecified whether esophagitis present  Assessment & Plan:  - Stable.  - Continue Protonix 40mg daily.      6. Anxiety and depression  Assessment & Plan:  - Mood stable.  - Continue Lexapro 10mg daily.           Follow up in about 3 months (around 5/27/2024) for Chronic Medical Management.                 [Loss Of Hearing] : hearing loss [Incontinence] : incontinence [As Noted in HPI] : as noted in HPI [Negative] : Constitutional [FreeTextEntry3] : wears glasses [FreeTextEntry7] : FI +

## 2024-02-27 NOTE — ASSESSMENT & PLAN NOTE
- A1c 5.8, well-controlled.  - Urine microalbumin/Cr negative from 02/02/24.  - Continue Ozempic 0.5mg weekly.  - Patient following with [facility] for her eye exams.  - Diabetic foot exam performed.

## 2024-02-27 NOTE — ASSESSMENT & PLAN NOTE
- Lipid panel positive for elevated cholesterol 234 and elevated .  - Calcium score 2 from 05/09/22.   - Recommend statin therapy due to comorbid conditions. Patient would like to defer statin therapy at this time. Would start moderate-intensity statin if patient is amenable in the future and titrate to high-intensity if appropriate.  - Patient counseled regarding lifestyle modifications with diet and exercise.  - Continue to monitor.

## 2024-02-27 NOTE — ASSESSMENT & PLAN NOTE
- BP well-controlled.  - Patient following with Dr. Delbert Giron with Cardiology.  - Continue Olmesartan-HCTZ 20mg-12.5mg daily.

## 2024-04-08 ENCOUNTER — OFFICE VISIT (OUTPATIENT)
Dept: FAMILY MEDICINE | Facility: CLINIC | Age: 64
End: 2024-04-08
Payer: COMMERCIAL

## 2024-04-08 VITALS
RESPIRATION RATE: 16 BRPM | OXYGEN SATURATION: 99 % | HEIGHT: 65 IN | WEIGHT: 171.38 LBS | HEART RATE: 70 BPM | DIASTOLIC BLOOD PRESSURE: 84 MMHG | BODY MASS INDEX: 28.55 KG/M2 | SYSTOLIC BLOOD PRESSURE: 144 MMHG

## 2024-04-08 DIAGNOSIS — M54.2 NECK PAIN: Primary | ICD-10-CM

## 2024-04-08 DIAGNOSIS — F32.A ANXIETY AND DEPRESSION: ICD-10-CM

## 2024-04-08 DIAGNOSIS — F41.9 ANXIETY AND DEPRESSION: ICD-10-CM

## 2024-04-08 DIAGNOSIS — R51.9 NONINTRACTABLE EPISODIC HEADACHE, UNSPECIFIED HEADACHE TYPE: ICD-10-CM

## 2024-04-08 PROCEDURE — 1159F MED LIST DOCD IN RCRD: CPT | Mod: CPTII,,, | Performed by: STUDENT IN AN ORGANIZED HEALTH CARE EDUCATION/TRAINING PROGRAM

## 2024-04-08 PROCEDURE — 99214 OFFICE O/P EST MOD 30 MIN: CPT | Mod: ,,, | Performed by: STUDENT IN AN ORGANIZED HEALTH CARE EDUCATION/TRAINING PROGRAM

## 2024-04-08 PROCEDURE — 3061F NEG MICROALBUMINURIA REV: CPT | Mod: CPTII,,, | Performed by: STUDENT IN AN ORGANIZED HEALTH CARE EDUCATION/TRAINING PROGRAM

## 2024-04-08 PROCEDURE — 3066F NEPHROPATHY DOC TX: CPT | Mod: CPTII,,, | Performed by: STUDENT IN AN ORGANIZED HEALTH CARE EDUCATION/TRAINING PROGRAM

## 2024-04-08 PROCEDURE — 3044F HG A1C LEVEL LT 7.0%: CPT | Mod: CPTII,,, | Performed by: STUDENT IN AN ORGANIZED HEALTH CARE EDUCATION/TRAINING PROGRAM

## 2024-04-08 PROCEDURE — 3008F BODY MASS INDEX DOCD: CPT | Mod: CPTII,,, | Performed by: STUDENT IN AN ORGANIZED HEALTH CARE EDUCATION/TRAINING PROGRAM

## 2024-04-08 PROCEDURE — 3077F SYST BP >= 140 MM HG: CPT | Mod: CPTII,,, | Performed by: STUDENT IN AN ORGANIZED HEALTH CARE EDUCATION/TRAINING PROGRAM

## 2024-04-08 PROCEDURE — 3079F DIAST BP 80-89 MM HG: CPT | Mod: CPTII,,, | Performed by: STUDENT IN AN ORGANIZED HEALTH CARE EDUCATION/TRAINING PROGRAM

## 2024-04-08 RX ORDER — ESCITALOPRAM OXALATE 20 MG/1
20 TABLET ORAL DAILY
Qty: 90 TABLET | Refills: 0 | Status: SHIPPED | OUTPATIENT
Start: 2024-04-08 | End: 2024-04-12 | Stop reason: DRUGHIGH

## 2024-04-08 NOTE — PROGRESS NOTES
"Subjective:      Patient ID: Jenny Epps is a 64 y.o.  female.    Chief Complaint: Neck Pain/Headaches    Neck Pain/Headaches: Onset x 1 week. These are intermittent. Patient has been taking BC powder and Advil.    Anxiety/Depression: Patient asking for intermittent leave. She is also amenable to dose increase of her Lexapro.    Review of Systems   Eyes:  Positive for visual disturbance (blurry vision with headaches).   Cardiovascular:  Negative for chest pain and palpitations.   Gastrointestinal:  Negative for nausea and vomiting.   Musculoskeletal:  Positive for neck pain. Negative for neck stiffness.   Neurological:  Positive for headaches. Negative for weakness, light-headedness and numbness.        +"off-balance"   Psychiatric/Behavioral:  Positive for dysphoric mood. Negative for hallucinations, self-injury and suicidal ideas. The patient is nervous/anxious.      Objective:   BP (!) 144/84 (BP Location: Right arm)   Pulse 70   Resp 16   Ht 5' 5" (1.651 m)   Wt 77.7 kg (171 lb 6.4 oz)   SpO2 99%   BMI 28.52 kg/m²     Physical Exam  Vitals and nursing note reviewed.   Constitutional:       General: She is not in acute distress.     Appearance: Normal appearance. She is not ill-appearing or toxic-appearing.   HENT:      Head: Normocephalic and atraumatic.   Eyes:      Extraocular Movements: Extraocular movements intact.      Conjunctiva/sclera: Conjunctivae normal.   Neck:      Comments: Some decreased ROM due to pain. Some hypertonicity palpated.  Cardiovascular:      Rate and Rhythm: Normal rate and regular rhythm.      Heart sounds: Normal heart sounds. No murmur heard.  Pulmonary:      Effort: Pulmonary effort is normal. No respiratory distress.      Breath sounds: Normal breath sounds.   Abdominal:      General: There is no distension.      Palpations: Abdomen is soft.      Tenderness: There is no abdominal tenderness.   Musculoskeletal:         General: No deformity or signs of " injury.      Cervical back: Neck supple. No rigidity or tenderness.      Right lower leg: No edema.      Left lower leg: No edema.   Lymphadenopathy:      Cervical: No cervical adenopathy.   Skin:     General: Skin is warm and dry.      Findings: No lesion or rash.   Neurological:      General: No focal deficit present.      Mental Status: She is alert. Mental status is at baseline.      Motor: No weakness.      Coordination: Coordination normal.   Psychiatric:         Thought Content: Thought content normal.         Judgment: Judgment normal.      Comments: Depressed mood.       Assessment/Plan:   1. Neck pain  Comments:  - Continue symptomatic treatment.    2. Nonintractable episodic headache, unspecified headache type  Comments:  - Likely secondary to neck pain.  - Continue symptomatic treatment.    3. Anxiety and depression  Assessment & Plan:  - Mood deteriorated.  - Increasing Lexapro from 10mg to 20mg daily.  - Re-evaluation in 2 weeks.    Orders:  -     EScitalopram oxalate (LEXAPRO) 20 MG tablet; Take 1 tablet (20 mg total) by mouth once daily.  Dispense: 90 tablet; Refill: 0       Follow up in about 2 weeks (around 4/22/2024) for Anxiety/Depression.

## 2024-04-12 ENCOUNTER — TELEPHONE (OUTPATIENT)
Dept: FAMILY MEDICINE | Facility: CLINIC | Age: 64
End: 2024-04-12
Payer: COMMERCIAL

## 2024-04-12 DIAGNOSIS — R51.9 ACUTE INTRACTABLE HEADACHE, UNSPECIFIED HEADACHE TYPE: ICD-10-CM

## 2024-04-12 DIAGNOSIS — F32.A ANXIETY AND DEPRESSION: Primary | ICD-10-CM

## 2024-04-12 DIAGNOSIS — F41.9 ANXIETY AND DEPRESSION: Primary | ICD-10-CM

## 2024-04-12 RX ORDER — ESCITALOPRAM OXALATE 10 MG/1
10 TABLET ORAL DAILY
Qty: 90 TABLET | Refills: 3 | Status: SHIPPED | OUTPATIENT
Start: 2024-04-12 | End: 2025-04-12

## 2024-04-12 NOTE — TELEPHONE ENCOUNTER
----- Message from Carolina Cevallos sent at 4/12/2024  9:29 AM CDT -----  Regarding: med advice  .Type:  Needs Medical Advice    Who Called: Pt  Symptoms (please be specific): Headaches   How long has patient had these symptoms:    Pharmacy name and phone #:    Would the patient rather a call back or a response via [facility]? Call back  Best Call Back Number: [phone]  Additional Information: Pt state she is still having headaches, wants to know if a scan is possible. Also states her LEXAPRO  was increased to 20 MG and she rather go to 10 MG, pt hasn't  meds yet.

## 2024-04-12 NOTE — TELEPHONE ENCOUNTER
----- Message from Marcy Martin sent at 4/12/2024  9:51 AM CDT -----  Regarding: pharmacy call  Type:  Pharmacy Calling to Clarify an RX    Name of Caller:bobo   Pharm[facility] Name:[facility]  Prescription Name:EScitalopram oxalate (LEXAPRO) 10 MG tablet  What do they need to clarify?:dosage   Best Call Back Number:[phone]   Additional Information: stated that a 5mg and 20mg of lexapro active with them and is needing clarity of which dosage pt is suppose to be on. Please advise

## 2024-04-12 NOTE — TELEPHONE ENCOUNTER
Dr. Mckay sent in new script for 10 mg  Lexapro per patient request.    Dosage has been clarified.

## 2024-04-12 NOTE — TELEPHONE ENCOUNTER
Will order CT head w/o contrast for patient.    Will decrease Lexapro back to 10mg daily patient and resend script.    Orders Placed This Encounter   Procedures    CT Head Without Contrast     Standing Status:   Future     Standing Expiration Date:   4/12/2025     Order Specific Question:   May the Radiologist modify the order per protocol to meet the clinical needs of the patient?     Answer:   Yes       Medications Ordered This Encounter   Medications    EScitalopram oxalate (LEXAPRO) 10 MG tablet     Sig: Take 1 tablet (10 mg total) by mouth once daily.     Dispense:  90 tablet     Refill:  3

## 2024-04-17 ENCOUNTER — TELEPHONE (OUTPATIENT)
Dept: FAMILY MEDICINE | Facility: CLINIC | Age: 64
End: 2024-04-17
Payer: COMMERCIAL

## 2024-04-17 NOTE — TELEPHONE ENCOUNTER
The CT head is without contrast, this is what we traditionally start with first. The CT is also just of the head and not the neck that we ordered for headaches.

## 2024-04-17 NOTE — TELEPHONE ENCOUNTER
Returned Patient Call.    Patient want to know if the CT order will image her head and neck.     Patient also states she see it is without contrast and want to know if they will still be able to capture all vessels without using contrast.     Please Advise.

## 2024-04-17 NOTE — TELEPHONE ENCOUNTER
----- Message from Dixon Byrd sent at 4/17/2024 11:07 AM CDT -----  .Type:  Needs Medical Advice    Who Called: pt  Symptoms (please be specific):    How long has patient had these symptoms:    Pharmacy name and phone #:    Would the patient rather a call back or a response via [facility]? Call back  Best Call Back Number:  [phone]  Additional Information: pt called wanting to speak with nurse to discuss upcoming MRI

## 2024-04-19 ENCOUNTER — HOSPITAL ENCOUNTER (OUTPATIENT)
Dept: RADIOLOGY | Facility: HOSPITAL | Age: 64
Discharge: HOME | End: 2024-04-19
Attending: STUDENT IN AN ORGANIZED HEALTH CARE EDUCATION/TRAINING PROGRAM
Payer: COMMERCIAL

## 2024-04-19 DIAGNOSIS — R51.9 ACUTE INTRACTABLE HEADACHE, UNSPECIFIED HEADACHE TYPE: ICD-10-CM

## 2024-04-19 PROCEDURE — 70450 CT HEAD/BRAIN W/O DYE: CPT | Mod: TC

## 2024-04-22 ENCOUNTER — OFFICE VISIT (OUTPATIENT)
Dept: FAMILY MEDICINE | Facility: CLINIC | Age: 64
End: 2024-04-22
Payer: COMMERCIAL

## 2024-04-22 VITALS
OXYGEN SATURATION: 99 % | WEIGHT: 178 LBS | HEART RATE: 64 BPM | SYSTOLIC BLOOD PRESSURE: 120 MMHG | TEMPERATURE: 98 F | DIASTOLIC BLOOD PRESSURE: 76 MMHG | HEIGHT: 65 IN | BODY MASS INDEX: 29.66 KG/M2 | RESPIRATION RATE: 16 BRPM

## 2024-04-22 DIAGNOSIS — R51.9 ACUTE NONINTRACTABLE HEADACHE, UNSPECIFIED HEADACHE TYPE: ICD-10-CM

## 2024-04-22 DIAGNOSIS — F32.A ANXIETY AND DEPRESSION: Primary | ICD-10-CM

## 2024-04-22 DIAGNOSIS — G93.89 CEREBRAL GLIOSIS: Primary | ICD-10-CM

## 2024-04-22 DIAGNOSIS — F41.9 ANXIETY AND DEPRESSION: Primary | ICD-10-CM

## 2024-04-22 PROCEDURE — 3074F SYST BP LT 130 MM HG: CPT | Mod: CPTII,,, | Performed by: STUDENT IN AN ORGANIZED HEALTH CARE EDUCATION/TRAINING PROGRAM

## 2024-04-22 PROCEDURE — 3066F NEPHROPATHY DOC TX: CPT | Mod: CPTII,,, | Performed by: STUDENT IN AN ORGANIZED HEALTH CARE EDUCATION/TRAINING PROGRAM

## 2024-04-22 PROCEDURE — 1159F MED LIST DOCD IN RCRD: CPT | Mod: CPTII,,, | Performed by: STUDENT IN AN ORGANIZED HEALTH CARE EDUCATION/TRAINING PROGRAM

## 2024-04-22 PROCEDURE — 99214 OFFICE O/P EST MOD 30 MIN: CPT | Mod: ,,, | Performed by: STUDENT IN AN ORGANIZED HEALTH CARE EDUCATION/TRAINING PROGRAM

## 2024-04-22 PROCEDURE — 3008F BODY MASS INDEX DOCD: CPT | Mod: CPTII,,, | Performed by: STUDENT IN AN ORGANIZED HEALTH CARE EDUCATION/TRAINING PROGRAM

## 2024-04-22 PROCEDURE — 3061F NEG MICROALBUMINURIA REV: CPT | Mod: CPTII,,, | Performed by: STUDENT IN AN ORGANIZED HEALTH CARE EDUCATION/TRAINING PROGRAM

## 2024-04-22 PROCEDURE — 3078F DIAST BP <80 MM HG: CPT | Mod: CPTII,,, | Performed by: STUDENT IN AN ORGANIZED HEALTH CARE EDUCATION/TRAINING PROGRAM

## 2024-04-22 PROCEDURE — 3044F HG A1C LEVEL LT 7.0%: CPT | Mod: CPTII,,, | Performed by: STUDENT IN AN ORGANIZED HEALTH CARE EDUCATION/TRAINING PROGRAM

## 2024-04-22 NOTE — ASSESSMENT & PLAN NOTE
- Mood improved.  - Continue Lexapro 10mg daily. Patient states she will try to alternate doses of the 5-10mg and see how she feels.

## 2024-04-22 NOTE — PROGRESS NOTES
"Subjective:      Patient ID: Jenny Epps is a 64 y.o.  female.    Chief Complaint: Anxiety/Depression Follow-Up    Anxiety/Depression: Patient states mood improved. However, she thinks the 10mg of the Lexapro may be too much for her. She thinks she'll try alternating 10mg and 5mg to see how she feels.    Headaches: CT head results with chronic changes; however, patient would like further evaluation with MRI.    Review of Systems   Eyes:  Positive for visual disturbance (blurry vision with headaches).   Cardiovascular:  Negative for chest pain and palpitations.   Gastrointestinal:  Negative for nausea and vomiting.   Musculoskeletal:  Positive for neck pain. Negative for neck stiffness.   Neurological:  Positive for headaches. Negative for weakness, light-headedness and numbness.        +"off-balance"   Psychiatric/Behavioral:  Positive for dysphoric mood. Negative for hallucinations, self-injury and suicidal ideas. The patient is nervous/anxious.      Objective:   /76 (BP Location: Right arm)   Pulse 64   Temp 97.5 °F (36.4 °C) (Temporal)   Resp 16   Ht 5' 5" (1.651 m)   Wt 80.7 kg (178 lb)   SpO2 99%   BMI 29.62 kg/m²     Physical Exam  Vitals and nursing note reviewed.   Constitutional:       General: She is not in acute distress.     Appearance: Normal appearance. She is not ill-appearing or toxic-appearing.   HENT:      Head: Normocephalic and atraumatic.   Eyes:      General:         Right eye: No discharge.         Left eye: No discharge.      Extraocular Movements: Extraocular movements intact.      Conjunctiva/sclera: Conjunctivae normal.      Pupils: Pupils are equal, round, and reactive to light.   Cardiovascular:      Rate and Rhythm: Normal rate and regular rhythm.      Heart sounds: Normal heart sounds. No murmur heard.  Pulmonary:      Effort: Pulmonary effort is normal. No respiratory distress.      Breath sounds: Normal breath sounds.   Abdominal:      General: There is " no distension.      Palpations: Abdomen is soft.      Tenderness: There is no abdominal tenderness.   Musculoskeletal:         General: No deformity or signs of injury.      Right lower leg: No edema.      Left lower leg: No edema.   Skin:     General: Skin is warm and dry.      Findings: No lesion or rash.   Neurological:      General: No focal deficit present.      Mental Status: She is alert. Mental status is at baseline.      Motor: No weakness.      Coordination: Coordination normal.   Psychiatric:         Mood and Affect: Mood normal.         Behavior: Behavior normal.         Thought Content: Thought content normal.         Judgment: Judgment normal.       Assessment/Plan:   1. Anxiety and depression  Assessment & Plan:  - Mood improved.  - Continue Lexapro 10mg daily. Patient states she will try to alternate doses of the 5-10mg and see how she feels.      2. Acute nonintractable headache, unspecified headache type  Comments:  - CT head results reviewed with patient.  - MRI ordered for further evaluation.

## 2024-05-08 DIAGNOSIS — G93.89 CEREBRAL GLIOSIS: Primary | ICD-10-CM

## 2024-05-20 ENCOUNTER — APPOINTMENT (OUTPATIENT)
Dept: RADIOLOGY | Facility: HOSPITAL | Age: 64
End: 2024-05-20
Attending: STUDENT IN AN ORGANIZED HEALTH CARE EDUCATION/TRAINING PROGRAM
Payer: COMMERCIAL

## 2024-05-20 DIAGNOSIS — G93.89 CEREBRAL GLIOSIS: ICD-10-CM

## 2024-05-20 PROCEDURE — 70553 MRI BRAIN STEM W/O & W/DYE: CPT | Mod: TC

## 2024-05-20 PROCEDURE — A9577 INJ MULTIHANCE: HCPCS | Performed by: STUDENT IN AN ORGANIZED HEALTH CARE EDUCATION/TRAINING PROGRAM

## 2024-05-20 PROCEDURE — 25500020 PHARM REV CODE 255: Performed by: STUDENT IN AN ORGANIZED HEALTH CARE EDUCATION/TRAINING PROGRAM

## 2024-05-20 RX ADMIN — GADOBENATE DIMEGLUMINE 15 ML: 529 INJECTION, SOLUTION INTRAVENOUS at 04:05

## 2024-05-21 DIAGNOSIS — R51.9 ACUTE NONINTRACTABLE HEADACHE, UNSPECIFIED HEADACHE TYPE: Primary | ICD-10-CM

## 2024-06-04 DIAGNOSIS — Z12.31 OTHER SCREENING MAMMOGRAM: Primary | ICD-10-CM

## 2024-06-06 ENCOUNTER — HOSPITAL ENCOUNTER (OUTPATIENT)
Dept: RADIOLOGY | Facility: HOSPITAL | Age: 64
Discharge: HOME | End: 2024-06-06
Attending: SPECIALIST
Payer: COMMERCIAL

## 2024-06-06 DIAGNOSIS — Z12.31 OTHER SCREENING MAMMOGRAM: ICD-10-CM

## 2024-06-06 PROCEDURE — 77063 BREAST TOMOSYNTHESIS BI: CPT | Mod: TC

## 2024-06-06 PROCEDURE — 77063 BREAST TOMOSYNTHESIS BI: CPT | Mod: 26,,, | Performed by: STUDENT IN AN ORGANIZED HEALTH CARE EDUCATION/TRAINING PROGRAM

## 2024-06-06 PROCEDURE — 77067 SCR MAMMO BI INCL CAD: CPT | Mod: 26,,, | Performed by: STUDENT IN AN ORGANIZED HEALTH CARE EDUCATION/TRAINING PROGRAM

## 2024-06-27 ENCOUNTER — OFFICE VISIT (OUTPATIENT)
Dept: NEUROLOGY | Facility: CLINIC | Age: 64
End: 2024-06-27
Payer: COMMERCIAL

## 2024-06-27 VITALS
DIASTOLIC BLOOD PRESSURE: 77 MMHG | WEIGHT: 174.19 LBS | BODY MASS INDEX: 29.02 KG/M2 | HEART RATE: 67 BPM | SYSTOLIC BLOOD PRESSURE: 130 MMHG | HEIGHT: 65 IN

## 2024-06-27 DIAGNOSIS — G08 DURAL VENOUS SINUS THROMBOSIS: ICD-10-CM

## 2024-06-27 DIAGNOSIS — R51.9 ACUTE NONINTRACTABLE HEADACHE, UNSPECIFIED HEADACHE TYPE: ICD-10-CM

## 2024-06-27 DIAGNOSIS — G43.009 MIGRAINE WITHOUT AURA AND WITHOUT STATUS MIGRAINOSUS, NOT INTRACTABLE: Primary | ICD-10-CM

## 2024-06-27 DIAGNOSIS — R51.9 NEW ONSET OF HEADACHES AFTER AGE 50: ICD-10-CM

## 2024-06-27 PROCEDURE — 99204 OFFICE O/P NEW MOD 45 MIN: CPT | Mod: S$GLB,,, | Performed by: NURSE PRACTITIONER

## 2024-06-27 PROCEDURE — 3075F SYST BP GE 130 - 139MM HG: CPT | Mod: CPTII,S$GLB,, | Performed by: NURSE PRACTITIONER

## 2024-06-27 PROCEDURE — 3044F HG A1C LEVEL LT 7.0%: CPT | Mod: CPTII,S$GLB,, | Performed by: NURSE PRACTITIONER

## 2024-06-27 PROCEDURE — 1159F MED LIST DOCD IN RCRD: CPT | Mod: CPTII,S$GLB,, | Performed by: NURSE PRACTITIONER

## 2024-06-27 PROCEDURE — 3061F NEG MICROALBUMINURIA REV: CPT | Mod: CPTII,S$GLB,, | Performed by: NURSE PRACTITIONER

## 2024-06-27 PROCEDURE — 1160F RVW MEDS BY RX/DR IN RCRD: CPT | Mod: CPTII,S$GLB,, | Performed by: NURSE PRACTITIONER

## 2024-06-27 PROCEDURE — 99999 PR PBB SHADOW E&M-EST. PATIENT-LVL III: CPT | Mod: PBBFAC,,, | Performed by: NURSE PRACTITIONER

## 2024-06-27 PROCEDURE — 3078F DIAST BP <80 MM HG: CPT | Mod: CPTII,S$GLB,, | Performed by: NURSE PRACTITIONER

## 2024-06-27 PROCEDURE — 3008F BODY MASS INDEX DOCD: CPT | Mod: CPTII,S$GLB,, | Performed by: NURSE PRACTITIONER

## 2024-06-27 PROCEDURE — 3066F NEPHROPATHY DOC TX: CPT | Mod: CPTII,S$GLB,, | Performed by: NURSE PRACTITIONER

## 2024-06-27 RX ORDER — RIMEGEPANT SULFATE 75 MG/75MG
75 TABLET, ORALLY DISINTEGRATING ORAL
Qty: 16 TABLET | Refills: 2 | Status: SHIPPED | OUTPATIENT
Start: 2024-06-27

## 2024-06-27 NOTE — PROGRESS NOTES
Subjective:       Patient ID: Jenny Epps is a 64 y.o. female.    Chief Complaint: Headache (NP: Pt referred by Dr. Janeen Mckay for neuro consult to evaluate for headaches; Pt states HA's started a few months ago usually every two weeks that's last a few hours pain location to frontal can rotate to occipital smell/light sensitivity some nausea has had blurred vision to left eye )      History of Present Illness:  New patient referred by Dr. Janeen Mckay to evaluate for headaches.  64 year old woman with history of HTN and pre-DM.  She started having periodic headaches in her 30s.  They would be so severe that she would have to go to an ER and urgent care and received Toradol which would ultimately resolve the headaches.  They eventually resolved in her 40s and 50s, but over the last several months, she has had return of headaches.  They are so severe sometimes, they will wake her up from her sleep.  They are bifrontal and radiate to the bioccipital region.  With 1 of the severe headaches, she noticed left eye blurriness and left facial numbness.  She has associated photophobia, phonophobia, nausea.  The pain quality is a severe pressure sensation.  They will last for several hours.  She currently gets about 1-2 headaches every couple of weeks.  She is currently taking Tylenol for rescue, but it only works some of the time.  She is unsure if she has a family history of migraines as she just recently met her biological family.  She has noticed that poor sleep quality can trigger her migraines.  Of note, she is a  that works night shifts.  She had an MRI brain with and without contrast which revealed mild chronic microvascular ischemia.        Review of Systems  I have reviewed a 12 point review of systems which is scanned into the chart        Past Medical History:   Diagnosis Date    Anxiety     Depression     GERD (gastroesophageal reflux disease)     Hypertension     Personal history of colonic polyps  "2018       Past Surgical History:   Procedure Laterality Date     SECTION      COLONOSCOPY  2018    SPINE SURGERY  1998        Family History   Adopted: Yes   Problem Relation Name Age of Onset    No Known Problems Mother      No Known Problems Father          Social History     Socioeconomic History    Marital status:    Tobacco Use    Smoking status: Former     Current packs/day: 0.25     Average packs/day: 0.3 packs/day for 5.0 years (1.3 ttl pk-yrs)     Types: Cigarettes    Smokeless tobacco: Never   Substance and Sexual Activity    Alcohol use: Yes     Alcohol/week: 2.0 standard drinks of alcohol     Types: 2 Shots of liquor per week    Drug use: Never    Sexual activity: Not Currently     Partners: Male     Birth control/protection: None        Outpatient Encounter Medications as of 2024   Medication Sig Dispense Refill    diclofenac sodium (VOLTAREN) 1 % Gel Apply 2 g topically 4 (four) times daily. 100 g 0    EScitalopram oxalate (LEXAPRO) 10 MG tablet Take 1 tablet (10 mg total) by mouth once daily. 90 tablet 3    olmesartan-hydrochlorothiazide (BENICAR HCT) 20-12.5 mg per tablet TAKE 1 TABLET BY MOUTH EVERY DAY 30 tablet 5    pantoprazole (PROTONIX) 40 MG tablet Take 40 mg by mouth every morning.      semaglutide (OZEMPIC) 0.25 mg or 0.5 mg (2 mg/3 mL) pen injector Inject 0.25 mg into the skin every 7 days. (Patient taking differently: Inject 0.5 mg into the skin every 7 days.) 3 mL 6    sucralfate (CARAFATE) 1 gram tablet Take 1 g by mouth 3 (three) times daily.      rimegepant (NURTEC) 75 mg odt Take 1 tablet (75 mg total) by mouth as needed for Migraine. Place ODT tablet on the tongue; alternatively the ODT tablet may be placed under the tongue 16 tablet 2     No facility-administered encounter medications on file as of 2024.      Objective:   /77   Pulse 67   Ht 5' 5" (1.651 m)   Wt 79 kg (174 lb 3.2 oz)   BMI 28.99 kg/m²        Physical " Exam  General:  Alert and oriented  NAD  No overt edema    Cognition and Comprehension:  Speech and language intact  Follows commands    Cranial nerves:   CN 2_ Visual fields (full to confrontation both eyes)  CN 3, 4, 6_ Intact, KAREN, no nystagmus  CN 5_Decr sensation to left side with LT  CN 7_no face asymmetry; normal eye closure and smile  CN 8_hearing intact to spoken voice  CN 9, 10, 11_voice normal, shoulder shrug ok; deltoids not fatigable   CN 12 tongue_protrudes mid line    Muscle Strength and Tone:  Normal upper extremity tone  Normal lower extremity tone  Normal upper extremity strength  Normal lower extremity strength    Reflexes:  Globally hypo    Sensation:  Intact to light touch and temperature    Coordination and Gait:  Coordination and gait are normal   No ataxia      Assessment & Plan:      1. Migraine without aura and without status migrainosus, not intractable  Assessment & Plan:  -get sed rate given return of headaches after age 50 though suspicion low for arteritis.  Will also get MRV to r/o venous thrombosis given return of headaches and associated blurred vision and left facial numbness  -Given frequency, can try treated as needed with nurtec.  Triptans relatively contraindicated with HTN history and concurrent use of SSRI (risk for serotonin syndrome)    Orders:  -     rimegepant (NURTEC) 75 mg odt; Take 1 tablet (75 mg total) by mouth as needed for Migraine. Place ODT tablet on the tongue; alternatively the ODT tablet may be placed under the tongue  Dispense: 16 tablet; Refill: 2    2. Acute nonintractable headache, unspecified headache type  -     Ambulatory referral/consult to Neurology    3. Dural venous sinus thrombosis  -     MRV Brain Without Contrast; Future; Expected date: 06/27/2024    4. New onset of headaches after age 50  -     Sedimentation rate; Future; Expected date: 06/27/2024          This note was created with the assistance of voice recognition software. There may be  transcription errors as a result of using this technology however minimal. Effort has been made to assure accuracy of transcription but any obvious errors or omissions should be clarified with the author of the document.

## 2024-06-27 NOTE — ASSESSMENT & PLAN NOTE
-get sed rate given return of headaches after age 50 though suspicion low for arteritis.  Will also get MRV to r/o venous thrombosis given return of headaches and associated blurred vision and left facial numbness  -Given frequency, can try treated as needed with nurtec.  Triptans relatively contraindicated with HTN history and concurrent use of SSRI (risk for serotonin syndrome)

## 2024-07-10 ENCOUNTER — APPOINTMENT (OUTPATIENT)
Dept: RADIOLOGY | Facility: HOSPITAL | Age: 64
End: 2024-07-10
Attending: NURSE PRACTITIONER
Payer: COMMERCIAL

## 2024-07-10 DIAGNOSIS — G08 DURAL VENOUS SINUS THROMBOSIS: ICD-10-CM

## 2024-07-10 PROCEDURE — 70544 MR ANGIOGRAPHY HEAD W/O DYE: CPT | Mod: TC

## 2024-07-11 ENCOUNTER — TELEPHONE (OUTPATIENT)
Dept: NEUROLOGY | Facility: CLINIC | Age: 64
End: 2024-07-11
Payer: COMMERCIAL

## 2024-07-11 NOTE — TELEPHONE ENCOUNTER
----- Message from JONI Badillo sent at 7/11/2024  1:28 PM CDT -----  MRV was normal.  Let her know

## 2024-08-13 ENCOUNTER — TELEPHONE (OUTPATIENT)
Dept: FAMILY MEDICINE | Facility: CLINIC | Age: 64
End: 2024-08-13
Payer: COMMERCIAL

## 2024-08-13 DIAGNOSIS — I10 PRIMARY HYPERTENSION: Primary | ICD-10-CM

## 2024-08-13 RX ORDER — OLMESARTAN MEDOXOMIL AND HYDROCHLOROTHIAZIDE 20/12.5 20; 12.5 MG/1; MG/1
1 TABLET ORAL DAILY
Qty: 30 TABLET | Refills: 5 | Status: SHIPPED | OUTPATIENT
Start: 2024-08-13

## 2024-08-13 NOTE — TELEPHONE ENCOUNTER
----- Message from Marcy Martin sent at 8/13/2024  1:32 PM CDT -----  Regarding: refill  Who Called: Jenny Epps    RX Name and Strength:olmesartan-hydrochlorothiazide (BENICAR HCT) 20-12.5 mg per tablet     List of preferred pharmacies on file (remove unneeded): [facility] [address]    Patient's Preferred Phone Number on File: [phone]   Best Call Back Number, if different:  Additional Information:

## 2024-09-08 DIAGNOSIS — E11.9 TYPE 2 DIABETES MELLITUS WITHOUT COMPLICATION, WITHOUT LONG-TERM CURRENT USE OF INSULIN: ICD-10-CM

## 2024-09-10 RX ORDER — SEMAGLUTIDE 0.68 MG/ML
0.5 INJECTION, SOLUTION SUBCUTANEOUS
Qty: 12 EACH | Refills: 0 | Status: SHIPPED | OUTPATIENT
Start: 2024-09-10

## 2024-09-19 ENCOUNTER — TELEPHONE (OUTPATIENT)
Dept: FAMILY MEDICINE | Facility: CLINIC | Age: 64
End: 2024-09-19
Payer: COMMERCIAL

## 2024-09-19 DIAGNOSIS — E55.9 VITAMIN D DEFICIENCY: Primary | ICD-10-CM

## 2024-09-19 DIAGNOSIS — E11.9 TYPE 2 DIABETES MELLITUS WITHOUT COMPLICATION, WITHOUT LONG-TERM CURRENT USE OF INSULIN: ICD-10-CM

## 2024-09-19 DIAGNOSIS — E78.2 MIXED HYPERLIPIDEMIA: ICD-10-CM

## 2024-09-19 DIAGNOSIS — D53.9 MACROCYTIC ANEMIA: ICD-10-CM

## 2024-09-19 NOTE — TELEPHONE ENCOUNTER
I have ordered the following labs. Please notify the patient.    Orders Placed This Encounter   Procedures    Vitamin D     Standing Status:   Future     Standing Expiration Date:   12/19/2024    CBC Auto Differential     Standing Status:   Future     Standing Expiration Date:   12/19/2024    Comprehensive Metabolic Panel     Standing Status:   Future     Standing Expiration Date:   12/19/2024    Hemoglobin A1C     Standing Status:   Future     Standing Expiration Date:   12/19/2024    Iron and TIBC     Standing Status:   Future     Standing Expiration Date:   11/18/2025    Ferritin     Standing Status:   Future     Standing Expiration Date:   12/18/2025    Lipid Panel     Standing Status:   Future     Standing Expiration Date:   12/19/2024

## 2024-09-19 NOTE — TELEPHONE ENCOUNTER
----- Message from Zaina Machado sent at 9/19/2024  2:46 PM CDT -----  Who Called: Jenny Epps    Caller is requesting assistance/information from provider's office.    Symptoms (please be specific):    How long has patient had these symptoms:    List of preferred pharmacies on file (remove unneeded): [unfilled]  If different, enter pharmacy into here including location and phone number:       Patient's Preferred Phone Number on File: [phone]   Best Call Back Number, if different:  Additional Information: pt called to r/s missed appt on 05/29, would like  to req labs are ordered  before upcoming appt on 09/25. Pt req :  CBC, CMP , Iron , Vitiamin  D .

## 2024-09-24 ENCOUNTER — TELEPHONE (OUTPATIENT)
Dept: FAMILY MEDICINE | Facility: CLINIC | Age: 64
End: 2024-09-24
Payer: COMMERCIAL

## 2024-09-24 ENCOUNTER — LAB VISIT (OUTPATIENT)
Dept: LAB | Facility: HOSPITAL | Age: 64
End: 2024-09-24
Attending: STUDENT IN AN ORGANIZED HEALTH CARE EDUCATION/TRAINING PROGRAM
Payer: COMMERCIAL

## 2024-09-24 DIAGNOSIS — D53.9 MACROCYTIC ANEMIA: Primary | ICD-10-CM

## 2024-09-24 DIAGNOSIS — E55.9 VITAMIN D DEFICIENCY: ICD-10-CM

## 2024-09-24 DIAGNOSIS — D53.9 MACROCYTIC ANEMIA: ICD-10-CM

## 2024-09-24 DIAGNOSIS — E78.2 MIXED HYPERLIPIDEMIA: ICD-10-CM

## 2024-09-24 DIAGNOSIS — E11.9 TYPE 2 DIABETES MELLITUS WITHOUT COMPLICATION, WITHOUT LONG-TERM CURRENT USE OF INSULIN: ICD-10-CM

## 2024-09-24 LAB
25(OH)D3+25(OH)D2 SERPL-MCNC: 50 NG/ML (ref 30–80)
ALBUMIN SERPL-MCNC: 3.9 G/DL (ref 3.4–4.8)
ALBUMIN/GLOB SERPL: 1.4 RATIO (ref 1.1–2)
ALP SERPL-CCNC: 68 UNIT/L (ref 40–150)
ALT SERPL-CCNC: 8 UNIT/L (ref 0–55)
ANION GAP SERPL CALC-SCNC: 6 MEQ/L
AST SERPL-CCNC: 20 UNIT/L (ref 5–34)
BASOPHILS # BLD AUTO: 0.02 X10(3)/MCL
BASOPHILS NFR BLD AUTO: 0.4 %
BILIRUB SERPL-MCNC: 0.7 MG/DL
BUN SERPL-MCNC: 20.4 MG/DL (ref 9.8–20.1)
CALCIUM SERPL-MCNC: 9.6 MG/DL (ref 8.4–10.2)
CHLORIDE SERPL-SCNC: 105 MMOL/L (ref 98–107)
CHOLEST SERPL-MCNC: 220 MG/DL
CHOLEST/HDLC SERPL: 4 {RATIO} (ref 0–5)
CO2 SERPL-SCNC: 31 MMOL/L (ref 23–31)
CREAT SERPL-MCNC: 1.12 MG/DL (ref 0.55–1.02)
CREAT/UREA NIT SERPL: 18
EOSINOPHIL # BLD AUTO: 0.18 X10(3)/MCL (ref 0–0.9)
EOSINOPHIL NFR BLD AUTO: 3.4 %
ERYTHROCYTE [DISTWIDTH] IN BLOOD BY AUTOMATED COUNT: 13.8 % (ref 11.5–17)
EST. AVERAGE GLUCOSE BLD GHB EST-MCNC: 108.3 MG/DL
FERRITIN SERPL-MCNC: 62.84 NG/ML (ref 4.63–204)
GFR SERPLBLD CREATININE-BSD FMLA CKD-EPI: 56 ML/MIN/1.73/M2
GLOBULIN SER-MCNC: 2.7 GM/DL (ref 2.4–3.5)
GLUCOSE SERPL-MCNC: 85 MG/DL (ref 82–115)
HBA1C MFR BLD: 5.4 %
HCT VFR BLD AUTO: 35.6 % (ref 37–47)
HDLC SERPL-MCNC: 61 MG/DL (ref 35–60)
HGB BLD-MCNC: 11.5 G/DL (ref 12–16)
IMM GRANULOCYTES # BLD AUTO: 0.01 X10(3)/MCL (ref 0–0.04)
IMM GRANULOCYTES NFR BLD AUTO: 0.2 %
IRON SATN MFR SERPL: 46 % (ref 20–50)
IRON SERPL-MCNC: 121 UG/DL (ref 50–170)
LDLC SERPL CALC-MCNC: 146 MG/DL (ref 50–140)
LYMPHOCYTES # BLD AUTO: 2.34 X10(3)/MCL (ref 0.6–4.6)
LYMPHOCYTES NFR BLD AUTO: 44.5 %
MCH RBC QN AUTO: 30.3 PG (ref 27–31)
MCHC RBC AUTO-ENTMCNC: 32.3 G/DL (ref 33–36)
MCV RBC AUTO: 93.7 FL (ref 80–94)
MONOCYTES # BLD AUTO: 0.48 X10(3)/MCL (ref 0.1–1.3)
MONOCYTES NFR BLD AUTO: 9.1 %
NEUTROPHILS # BLD AUTO: 2.23 X10(3)/MCL (ref 2.1–9.2)
NEUTROPHILS NFR BLD AUTO: 42.4 %
NRBC BLD AUTO-RTO: 0 %
PLATELET # BLD AUTO: 211 X10(3)/MCL (ref 130–400)
PMV BLD AUTO: 10 FL (ref 7.4–10.4)
POTASSIUM SERPL-SCNC: 4 MMOL/L (ref 3.5–5.1)
PROT SERPL-MCNC: 6.6 GM/DL (ref 5.8–7.6)
RBC # BLD AUTO: 3.8 X10(6)/MCL (ref 4.2–5.4)
SODIUM SERPL-SCNC: 142 MMOL/L (ref 136–145)
TIBC SERPL-MCNC: 142 UG/DL (ref 70–310)
TIBC SERPL-MCNC: 263 UG/DL (ref 250–450)
TRANSFERRIN SERPL-MCNC: 238 MG/DL (ref 173–360)
TRIGL SERPL-MCNC: 66 MG/DL (ref 37–140)
TSH SERPL-ACNC: 2.97 UIU/ML (ref 0.35–4.94)
VLDLC SERPL CALC-MCNC: 13 MG/DL
WBC # BLD AUTO: 5.26 X10(3)/MCL (ref 4.5–11.5)

## 2024-09-24 PROCEDURE — 83550 IRON BINDING TEST: CPT

## 2024-09-24 PROCEDURE — 83036 HEMOGLOBIN GLYCOSYLATED A1C: CPT

## 2024-09-24 PROCEDURE — 85025 COMPLETE CBC W/AUTO DIFF WBC: CPT

## 2024-09-24 PROCEDURE — 80053 COMPREHEN METABOLIC PANEL: CPT

## 2024-09-24 PROCEDURE — 80061 LIPID PANEL: CPT

## 2024-09-24 PROCEDURE — 84443 ASSAY THYROID STIM HORMONE: CPT

## 2024-09-24 PROCEDURE — 36415 COLL VENOUS BLD VENIPUNCTURE: CPT

## 2024-09-24 PROCEDURE — 82306 VITAMIN D 25 HYDROXY: CPT

## 2024-09-24 PROCEDURE — 82728 ASSAY OF FERRITIN: CPT

## 2024-09-24 PROCEDURE — 83540 ASSAY OF IRON: CPT

## 2024-09-24 NOTE — TELEPHONE ENCOUNTER
----- Message from Trinidad Cruz sent at 9/24/2024 10:50 AM CDT -----  Who Called: Jenny Epps    What order is the patient requesting: TSH order   When does the expect the orders to be performed? Bracc( request needs to be to called into [facility] main lab to be added)    Preferred Method of Contact: Phone Call  Patient's Preferred Phone Number on File: [phone]   Best Call Back Number, if different:  Additional Information:  order request, pt called to have additional order added to current orders ( see info above)pt stated she was advised that order could be used from blood-work drawn today once called in, please advise, thanks

## 2024-09-25 ENCOUNTER — OFFICE VISIT (OUTPATIENT)
Dept: FAMILY MEDICINE | Facility: CLINIC | Age: 64
End: 2024-09-25
Payer: COMMERCIAL

## 2024-09-25 VITALS
HEART RATE: 70 BPM | SYSTOLIC BLOOD PRESSURE: 120 MMHG | HEIGHT: 65 IN | BODY MASS INDEX: 28.61 KG/M2 | DIASTOLIC BLOOD PRESSURE: 67 MMHG | OXYGEN SATURATION: 99 % | WEIGHT: 171.69 LBS | TEMPERATURE: 98 F | RESPIRATION RATE: 18 BRPM

## 2024-09-25 DIAGNOSIS — R79.89 ELEVATED SERUM CREATININE: ICD-10-CM

## 2024-09-25 DIAGNOSIS — B35.1 TOENAIL FUNGUS: ICD-10-CM

## 2024-09-25 DIAGNOSIS — E11.9 TYPE 2 DIABETES MELLITUS WITHOUT COMPLICATION, WITHOUT LONG-TERM CURRENT USE OF INSULIN: Primary | ICD-10-CM

## 2024-09-25 DIAGNOSIS — D64.9 NORMOCYTIC ANEMIA: ICD-10-CM

## 2024-09-25 DIAGNOSIS — F32.A ANXIETY AND DEPRESSION: ICD-10-CM

## 2024-09-25 DIAGNOSIS — E78.2 MIXED HYPERLIPIDEMIA: ICD-10-CM

## 2024-09-25 DIAGNOSIS — F41.9 ANXIETY AND DEPRESSION: ICD-10-CM

## 2024-09-25 DIAGNOSIS — I10 PRIMARY HYPERTENSION: ICD-10-CM

## 2024-09-25 PROCEDURE — 3078F DIAST BP <80 MM HG: CPT | Mod: CPTII,,, | Performed by: NURSE PRACTITIONER

## 2024-09-25 PROCEDURE — 3074F SYST BP LT 130 MM HG: CPT | Mod: CPTII,,, | Performed by: NURSE PRACTITIONER

## 2024-09-25 PROCEDURE — 1159F MED LIST DOCD IN RCRD: CPT | Mod: CPTII,,, | Performed by: NURSE PRACTITIONER

## 2024-09-25 PROCEDURE — 1160F RVW MEDS BY RX/DR IN RCRD: CPT | Mod: CPTII,,, | Performed by: NURSE PRACTITIONER

## 2024-09-25 PROCEDURE — 3066F NEPHROPATHY DOC TX: CPT | Mod: CPTII,,, | Performed by: NURSE PRACTITIONER

## 2024-09-25 PROCEDURE — 3044F HG A1C LEVEL LT 7.0%: CPT | Mod: CPTII,,, | Performed by: NURSE PRACTITIONER

## 2024-09-25 PROCEDURE — 3061F NEG MICROALBUMINURIA REV: CPT | Mod: CPTII,,, | Performed by: NURSE PRACTITIONER

## 2024-09-25 PROCEDURE — 99214 OFFICE O/P EST MOD 30 MIN: CPT | Mod: ,,, | Performed by: NURSE PRACTITIONER

## 2024-09-25 PROCEDURE — 3008F BODY MASS INDEX DOCD: CPT | Mod: CPTII,,, | Performed by: NURSE PRACTITIONER

## 2024-09-25 RX ORDER — ALUMINUM HYDROXIDE, MAGNESIUM HYDROXIDE, AND SIMETHICONE 2400; 240; 2400 MG/30ML; MG/30ML; MG/30ML
SUSPENSION ORAL
COMMUNITY

## 2024-09-25 NOTE — ASSESSMENT & PLAN NOTE
Stable; asymptomatic  Repeat labs in 2 months   Colonoscopy due (hx of Polyps): [facility]

## 2024-09-25 NOTE — PROGRESS NOTES
Subjective:      Patient ID: Jenny Epps is a 64 y.o. Black or  female      Chief Complaint: Follow-up (Chronic Illnesses/)       Past Medical History:   Diagnosis Date    Anxiety     Depression     GERD (gastroesophageal reflux disease)     History of hepatitis C     Hypertension     Personal history of colonic polyps 09/19/2018        HPI  Presents to clinic for 6-zurdo follow up.    DM: A1C 5.4 diagnosed 10/17/22. Patient not taking any medications. Does not check CBGs.  Denies any perceived hypoglycemia.    DM eye exam up to date (2024); Results requested     HTN/HLD: BP at goal.  .  Not on Statin medication. Patient taking Olmesartan-HCTZ daily.  Denies any headaches, weakness, dizziness, CP, or SOB. Denies any other problems.       GERD: Patient taking Protonix.     Anxiety/Depression: Stable on Lexapro.    Colonoscopy due:  she will call and schedule with [facility] before the end of the year.                Patient Care Team:  Janeen Mckay DO as PCP - General  Gregory Zazueta MD as Consulting Physician (Gastroenterology)  Delbetr Giron MD as Consulting Physician (Cardiology)  Cee Lombardi MD as Consulting Physician (Obstetrics and Gynecology)      Review of Systems   Constitutional: Negative.  Negative for appetite change, chills and fever.   HENT: Negative.     Eyes: Negative.  Negative for discharge and redness.   Respiratory: Negative.  Negative for shortness of breath.    Cardiovascular: Negative.  Negative for chest pain.   Gastrointestinal: Negative.    Endocrine: Negative.    Genitourinary: Negative.    Integumentary:  Negative.   Allergic/Immunologic: Negative.    Neurological: Negative.    Psychiatric/Behavioral: Negative.     All other systems reviewed and are negative.          Objective:      Vitals:    09/25/24 1448   BP: 120/67   Pulse: 70   Resp: 18   Temp: 98 °F (36.7 °C)      Body mass index is 28.57 kg/m².     Physical Exam  Vitals reviewed.    Constitutional:       Appearance: Normal appearance.   HENT:      Head: Normocephalic.      Mouth/Throat:      Mouth: Mucous membranes are moist.   Eyes:      Conjunctiva/sclera: Conjunctivae normal.      Pupils: Pupils are equal, round, and reactive to light.   Cardiovascular:      Rate and Rhythm: Normal rate and regular rhythm.   Pulmonary:      Effort: Pulmonary effort is normal. No respiratory distress.      Breath sounds: Normal breath sounds.   Musculoskeletal:         General: Normal range of motion.      Cervical back: Normal range of motion and neck supple.   Feet:      Comments: Mild opacity of right great toenail  Skin:     General: Skin is warm and dry.   Neurological:      Mental Status: She is alert and oriented to person, place, and time.   Psychiatric:         Mood and Affect: Mood normal.            Current Outpatient Medications:     diclofenac sodium (VOLTAREN) 1 % Gel, Apply 2 g topically 4 (four) times daily., Disp: 100 g, Rfl: 0    EScitalopram oxalate (LEXAPRO) 10 MG tablet, Take 1 tablet (10 mg total) by mouth once daily., Disp: 90 tablet, Rfl: 3    olmesartan-hydrochlorothiazide (BENICAR HCT) 20-12.5 mg per tablet, Take 1 tablet by mouth once daily., Disp: 30 tablet, Rfl: 5    pantoprazole (PROTONIX) 40 MG tablet, Take 40 mg by mouth every morning., Disp: , Rfl:     semaglutide (OZEMPIC) 0.25 mg or 0.5 mg (2 mg/3 mL) pen injector, Inject 0.5 mg into the skin every 7 days., Disp: 12 each, Rfl: 0    sucralfate (CARAFATE) 1 gram tablet, Take 1 g by mouth 3 (three) times daily., Disp: , Rfl:     aluminum & magnesium hydroxide-simethicone (MYLANTA MAX STRENGTH) 400-400-40 mg/5 mL suspension, 0, Disp: , Rfl:     Assessment & Plan:     Problem List Items Addressed This Visit          Psychiatric    Anxiety and depression     Stable  Continue Lexapro as prescribed  Keep appt with PCP for follow up              Derm    Toenail fungus     Mild; states she will call if she decides to proceed with  topical treatment options            Cardiac/Vascular    Primary hypertension     BP at goal  Continue current medication (Benicar HCT)  Daily BP check; bring log all clinic visits  Instructed to report to ED for any BP greater than 200/100, dizziness, syncope, CP, or SOB  Keep appt. With PCP for follow up  Patient is agreeable to plan and verbalizes understanding           Mixed hyperlipidemia     The 10-year ASCVD risk score (Laura HUYNH, et al., 2019) is: 15.4%    Values used to calculate the score:      Age: 64 years      Sex: Female      Is Non- : Yes      Diabetic: Yes      Tobacco smoker: No      Systolic Blood Pressure: 120 mmHg      Is BP treated: Yes      HDL Cholesterol: 61 mg/dL      Total Cholesterol: 220 mg/dL    Declines Statin at present  Educated on low fat, low cholesterol diet            Renal/    Elevated serum creatinine     Avoid NSAIDs   Ensure adequate hydration   Repeat labs in 2 months (Of note, pt currently on Benicar HCT)         Relevant Orders    Comprehensive Metabolic Panel       Oncology    Normocytic anemia     Stable; asymptomatic  Repeat labs in 2 months   Colonoscopy due (hx of Polyps): she will call and schedule with [facility] before the end of the year.            Relevant Orders    CBC Auto Differential       Endocrine    Type 2 diabetes mellitus without complication, without long-term current use of insulin - Primary     Stable; Diet controlled; A1C 5.4  Keep appt with PCP for follow up

## 2024-09-25 NOTE — ASSESSMENT & PLAN NOTE
BP at goal  Continue current medication (Benicar HCT)  Daily BP check; bring log all clinic visits  Instructed to report to ED for any BP greater than 200/100, dizziness, syncope, CP, or SOB  Keep appt. With PCP for follow up  Patient is agreeable to plan and verbalizes understanding

## 2024-09-25 NOTE — ASSESSMENT & PLAN NOTE
The 10-year ASCVD risk score (Laura HUYNH, et al., 2019) is: 15.4%    Values used to calculate the score:      Age: 64 years      Sex: Female      Is Non- : Yes      Diabetic: Yes      Tobacco smoker: No      Systolic Blood Pressure: 120 mmHg      Is BP treated: Yes      HDL Cholesterol: 61 mg/dL      Total Cholesterol: 220 mg/dL    Declines Statin at present  Educated on low fat, low cholesterol diet

## 2024-09-25 NOTE — ASSESSMENT & PLAN NOTE
Avoid NSAIDs   Ensure adequate hydration   Repeat labs in 2 months (Of note, pt currently on Benicar HCT)

## 2024-10-02 ENCOUNTER — OFFICE VISIT (OUTPATIENT)
Dept: FAMILY MEDICINE | Facility: CLINIC | Age: 64
End: 2024-10-02
Payer: COMMERCIAL

## 2024-10-02 VITALS
BODY MASS INDEX: 28.49 KG/M2 | HEIGHT: 65 IN | DIASTOLIC BLOOD PRESSURE: 81 MMHG | SYSTOLIC BLOOD PRESSURE: 138 MMHG | WEIGHT: 171 LBS | RESPIRATION RATE: 18 BRPM

## 2024-10-02 DIAGNOSIS — M54.32 LEFT SIDED SCIATICA: Primary | ICD-10-CM

## 2024-10-02 RX ORDER — GABAPENTIN 100 MG/1
100 CAPSULE ORAL 3 TIMES DAILY PRN
Qty: 90 CAPSULE | Refills: 0 | Status: SHIPPED | OUTPATIENT
Start: 2024-10-02 | End: 2025-10-02

## 2024-10-02 NOTE — PROGRESS NOTES
"Subjective:      Patient ID: Jenny Epps is a 64 y.o.  female.    Chief Complaint: Telemedicine Visit: Back and Hip Pain    Back and Hip Pain: Onset x greater than 1 week, but it had gone away in the past. Located to left lower back, left hip, and left buttock. Associated symptoms include numbness underneath the left foot. She denies any trauma. She's been taking Tylenol.    Review of Systems   Musculoskeletal:  Positive for arthralgias and back pain.   Neurological:  Positive for numbness.     Objective:   /81 (BP Location: Left arm, Patient Position: Sitting)   Resp 18   Ht 5' 5" (1.651 m)   Wt 77.6 kg (171 lb)   BMI 28.46 kg/m²     Physical Exam  Nursing note reviewed.   Constitutional:       General: She is not in acute distress.     Appearance: Normal appearance. She is not ill-appearing, toxic-appearing or diaphoretic.   Pulmonary:      Effort: No respiratory distress.   Neurological:      Mental Status: She is alert. Mental status is at baseline.   Psychiatric:         Mood and Affect: Mood normal.         Behavior: Behavior normal.         Thought Content: Thought content normal.         Judgment: Judgment normal.       Assessment/Plan:   1. Left sided sciatica  Comments:  - Exercises reviewed with patient.  - Starting Gabapentin PRN. Side effects reviewed with patient.  - Patient has follow-ups with Ortho and Neuro in the future as well.  Orders:  -     gabapentin (NEURONTIN) 100 MG capsule; Take 1 capsule (100 mg total) by mouth 3 (three) times daily as needed (nerve pain).  Dispense: 90 capsule; Refill: 0        This is a telemedicine note. Patient was treated using telemedicine, real time audio and video, according to [facility] protocols. Janeen ALEGRIA DO, conducted the visit from location identified below. The patient participated in the visit at a non-[facility] location selected by the patient (or patient's representative), identified below. I am licensed in a state " where the patient stated they are located. The patient (or patient's representative) stated they understood and accepted the privacy and security risks to their information at their location. Patient was located at home.    Participant in the visit: Patient    I, distant provider, was located at: [address] [address]    Face-to-face time spent with patient was 10 minutes, over 50% of which was used for education and counseling regarding medical conditions, current medications including risk/benefit and side effects/adverse events, over-the-counter medications-uses/doses, home self-care and contact precautions, and red flags and indications for immediate medical attention. The patient was receptive, expressed understanding, and was agreeable to plan. All questions answered.

## 2024-10-16 ENCOUNTER — OFFICE VISIT (OUTPATIENT)
Dept: ORTHOPEDICS | Facility: CLINIC | Age: 64
End: 2024-10-16
Payer: COMMERCIAL

## 2024-10-16 ENCOUNTER — HOSPITAL ENCOUNTER (OUTPATIENT)
Dept: RADIOLOGY | Facility: CLINIC | Age: 64
Discharge: HOME | End: 2024-10-16
Attending: PHYSICIAN ASSISTANT
Payer: COMMERCIAL

## 2024-10-16 VITALS
DIASTOLIC BLOOD PRESSURE: 78 MMHG | BODY MASS INDEX: 28.82 KG/M2 | WEIGHT: 173 LBS | SYSTOLIC BLOOD PRESSURE: 127 MMHG | HEART RATE: 77 BPM | HEIGHT: 65 IN

## 2024-10-16 DIAGNOSIS — M70.62 TROCHANTERIC BURSITIS OF LEFT HIP: Primary | ICD-10-CM

## 2024-10-16 DIAGNOSIS — M25.552 LEFT HIP PAIN: ICD-10-CM

## 2024-10-16 DIAGNOSIS — M75.112 NONTRAUMATIC INCOMPLETE TEAR OF LEFT ROTATOR CUFF: ICD-10-CM

## 2024-10-16 DIAGNOSIS — M25.512 ACUTE PAIN OF LEFT SHOULDER: ICD-10-CM

## 2024-10-16 DIAGNOSIS — M51.360 DEGENERATION OF INTERVERTEBRAL DISC OF LUMBAR REGION WITH DISCOGENIC BACK PAIN: ICD-10-CM

## 2024-10-16 PROCEDURE — 99214 OFFICE O/P EST MOD 30 MIN: CPT | Mod: ,,, | Performed by: PHYSICIAN ASSISTANT

## 2024-10-16 PROCEDURE — 3074F SYST BP LT 130 MM HG: CPT | Mod: CPTII,,, | Performed by: PHYSICIAN ASSISTANT

## 2024-10-16 PROCEDURE — 3008F BODY MASS INDEX DOCD: CPT | Mod: CPTII,,, | Performed by: PHYSICIAN ASSISTANT

## 2024-10-16 PROCEDURE — 1160F RVW MEDS BY RX/DR IN RCRD: CPT | Mod: CPTII,,, | Performed by: PHYSICIAN ASSISTANT

## 2024-10-16 PROCEDURE — 3078F DIAST BP <80 MM HG: CPT | Mod: CPTII,,, | Performed by: PHYSICIAN ASSISTANT

## 2024-10-16 PROCEDURE — 73030 X-RAY EXAM OF SHOULDER: CPT | Mod: LT,,, | Performed by: PHYSICIAN ASSISTANT

## 2024-10-16 PROCEDURE — 3066F NEPHROPATHY DOC TX: CPT | Mod: CPTII,,, | Performed by: PHYSICIAN ASSISTANT

## 2024-10-16 PROCEDURE — 3061F NEG MICROALBUMINURIA REV: CPT | Mod: CPTII,,, | Performed by: PHYSICIAN ASSISTANT

## 2024-10-16 PROCEDURE — 1159F MED LIST DOCD IN RCRD: CPT | Mod: CPTII,,, | Performed by: PHYSICIAN ASSISTANT

## 2024-10-16 PROCEDURE — 3044F HG A1C LEVEL LT 7.0%: CPT | Mod: CPTII,,, | Performed by: PHYSICIAN ASSISTANT

## 2024-10-16 PROCEDURE — 73502 X-RAY EXAM HIP UNI 2-3 VIEWS: CPT | Mod: LT,,, | Performed by: PHYSICIAN ASSISTANT

## 2024-10-16 NOTE — PROGRESS NOTES
Chief Complaint:   Chief Complaint   Patient presents with    Left Hip - Pain     Pt states she has been experiencing intermittent pain in the Lt hip that radiates into her whole leg to the bottom of her foot. Pt reports numbness, throbbing pain and buckling sensation.     Left Shoulder - Pain     Pt would like to address shoulder pain as well. Pain aggravates during exercises or when she raises her arm. Pt has hx of Lt shoulder tear.        History of present illness:    64-year-old right-hand dominant female presents office today for evaluation for left hip and left shoulder pain.  Her hip pain has been present on and off for 2 years.  She gets pain in the buttock that radiates to the lateral hip.  She denies groin pain.  It has gotten better on its own.  Her most recent pain has been present for couple weeks but has gotten a little better.  She is also complaining of some left shoulder pain.  Her pain is in the lateral aspect of the shoulder and worse with overhead movements.  She was diagnosed with a rotator cuff tear in the past but no treatment was initiated.    Past Medical History:   Diagnosis Date    Anxiety     Depression     GERD (gastroesophageal reflux disease)     History of hepatitis C     Hypertension     Personal history of colonic polyps 2018       Past Surgical History:   Procedure Laterality Date     SECTION      COLONOSCOPY  2018    SPINE SURGERY  1998       Current Outpatient Medications   Medication Sig    aluminum & magnesium hydroxide-simethicone (MYLANTA MAX STRENGTH) 400-400-40 mg/5 mL suspension 0    diclofenac sodium (VOLTAREN) 1 % Gel Apply 2 g topically 4 (four) times daily.    EScitalopram oxalate (LEXAPRO) 10 MG tablet Take 1 tablet (10 mg total) by mouth once daily.    olmesartan-hydrochlorothiazide (BENICAR HCT) 20-12.5 mg per tablet Take 1 tablet by mouth once daily.    pantoprazole (PROTONIX) 40 MG tablet Take 40 mg by mouth every morning.     semaglutide (OZEMPIC) 0.25 mg or 0.5 mg (2 mg/3 mL) pen injector Inject 0.5 mg into the skin every 7 days.    sucralfate (CARAFATE) 1 gram tablet Take 1 g by mouth 3 (three) times daily.    gabapentin (NEURONTIN) 100 MG capsule Take 1 capsule (100 mg total) by mouth 3 (three) times daily as needed (nerve pain). (Patient not taking: Reported on 10/16/2024)     No current facility-administered medications for this visit.       Review of patient's allergies indicates:   Allergen Reactions    Codeine Hives    Opioids - morphine analogues Hives    Tylox [oxycodone-acetaminophen] Hives     BP increases.       Family History   Adopted: Yes   Problem Relation Name Age of Onset    No Known Problems Mother      No Known Problems Father         Social History     Socioeconomic History    Marital status:    Tobacco Use    Smoking status: Former     Current packs/day: 0.25     Average packs/day: 0.3 packs/day for 5.2 years (1.3 ttl pk-yrs)     Types: Cigarettes    Smokeless tobacco: Never   Substance and Sexual Activity    Alcohol use: Yes     Alcohol/week: 2.0 standard drinks of alcohol     Types: 2 Shots of liquor per week    Drug use: Never    Sexual activity: Not Currently     Partners: Male     Birth control/protection: None     Social Drivers of Health     Financial Resource Strain: Low Risk  (9/25/2024)    Overall Financial Resource Strain (CARDIA)     Difficulty of Paying Living Expenses: Not hard at all   Food Insecurity: No Food Insecurity (9/25/2024)    Hunger Vital Sign     Worried About Running Out of Food in the Last Year: Never true     Ran Out of Food in the Last Year: Never true   Physical Activity: Unknown (9/25/2024)    Exercise Vital Sign     Days of Exercise per Week: 3 days   Stress: No Stress Concern Present (9/25/2024)    [employer] - Occupational Stress Questionnaire     Feeling of Stress : Only a little   Housing Stability: Unknown (9/25/2024)    Housing Stability  "Vital Sign     Unable to Pay for Housing in the Last Year: No         Review of Systems:    Constitution:   Denies chills, fever, and sweats.  HENT:   Denies headaches or blurry vision.  Cardiovascular:  Denies chest pain or irregular heart beat.  Respiratory:   Denies cough or shortness of breath.  Gastrointestinal:  Denies abdominal pain, nausea, or vomiting.  Musculoskeletal:   Denies muscle cramps.  Neurological:   Denies dizziness or focal weakness.  Psychiatric/Behavior: Normal mental status.  Hematology/Lymph:  Denies bleeding problem or easy bruising/bleeding.  Skin:    Denies rash or suspicious lesions.    Examination:    Vital Signs:    Vitals:    10/16/24 1543 10/16/24 1554   BP: 127/78    Pulse: 77    Weight: 78.5 kg (173 lb)    Height: 5' 5" (1.651 m)    PainSc:    4       Body mass index is 28.79 kg/m².    Constitution:   Well-developed, well nourished patient in no acute distress.  Neurological:   Alert and oriented x 3 and cooperative to examination.     Psychiatric/Behavior: Normal mental status.  Respiratory:   No shortness of breath.  Nonlabored breathing  Cardiovascular:           Regular rate and rhythm  Eyes:    Extraoccular muscles intact  Skin:    No scars, rash or suspicious lesions.    Physical Exam:     Left hip exam     No signs of edema, erythema, or induration.    Tender over the greater trochanteric region.    No Pain with internal and external rotation    Passive hip abduction 30 degrees   Passive hip flexion 90 degrees   Passive internal rotation 20 degrees   Passive external rotation 60 degrees   No weakness of the left hip was observed. An antalgic gait was observed. limping was noted     xrays    Left hip x-ray with two or more views of the AP and lateral aspects were performed.   Impressions   Mild joint space narrowing seen centrally with intact superior joint spaces.  Small periacetabular osteophytes.  She does have advanced disc disease at L4-5 and L5-S1    Left Shoulder: "     No swelling, erythema or increased heat    Tender over deltoid, supraspinatus and infraspinatus    Tender over bicipital groove    negative drop arm test   Positive Neer and Cohn impingement signs    No weakness with rotator cuff resistance     Active shoulder abduction 180  Active forward elevation  180  Active internal rotation 70   Active external rotation 80     Radiographs of the left shoulder, four views, taken in the office today show no osteoarticular abnormality        Assessment:     Trochanteric bursitis of left hip  -     X-Ray Hip 2 or 3 views Left with Pelvis when performed; Future; Expected date: 10/16/2024  -     Ambulatory referral/consult to Physical/Occupational Therapy; Future; Expected date: 10/23/2024    Nontraumatic incomplete tear of left rotator cuff  -     X-Ray Shoulder 2 or More Views Left; Future; Expected date: 10/16/2024  -     Ambulatory referral/consult to Physical/Occupational Therapy; Future; Expected date: 10/23/2024    Degeneration of intervertebral disc of lumbar region with discogenic back pain  -     Ambulatory referral/consult to Physical/Occupational Therapy; Future; Expected date: 10/23/2024        Plan:      I have discussed exam and x-ray findings with the patient today.  She does have some trochanteric bursitis that I do feel it is coming from her back.  I have offered a cortisone injection today but she has declined.  I recommend some physical therapy.  She is also dealing with a rotator cuff tear.  She does have good strength but some discomfort on exam.  We will start some physical therapy as well.  We will hold off on any injection.  She will let us know in the future if she feels that she needs an injection for either the hip or        No follow-ups on file.    DISCLAIMER: This note may have been dictated using voice recognition software and may contain grammatical errors.

## 2024-10-17 ENCOUNTER — PATIENT MESSAGE (OUTPATIENT)
Dept: FAMILY MEDICINE | Facility: CLINIC | Age: 64
End: 2024-10-17
Payer: COMMERCIAL

## 2024-10-17 ENCOUNTER — TELEPHONE (OUTPATIENT)
Dept: FAMILY MEDICINE | Facility: CLINIC | Age: 64
End: 2024-10-17
Payer: COMMERCIAL

## 2024-10-17 NOTE — TELEPHONE ENCOUNTER
----- Message from Zaina sent at 10/17/2024  8:32 AM CDT -----  Who Called: Jenny Epps        Preferred Method of Contact: Phone Call  Patient's Preferred Phone Number on File: [phone]   Best Call Back Number, if different:  Additional Information: called to determine if provider has any recommendations for GYN , pt current provider is retiring .

## 2024-10-22 ENCOUNTER — PATIENT MESSAGE (OUTPATIENT)
Dept: NEUROLOGY | Facility: CLINIC | Age: 64
End: 2024-10-22
Payer: COMMERCIAL

## 2024-10-23 ENCOUNTER — OFFICE VISIT (OUTPATIENT)
Dept: NEUROLOGY | Facility: CLINIC | Age: 64
End: 2024-10-23
Payer: COMMERCIAL

## 2024-10-23 VITALS
HEIGHT: 65 IN | BODY MASS INDEX: 28.16 KG/M2 | DIASTOLIC BLOOD PRESSURE: 84 MMHG | SYSTOLIC BLOOD PRESSURE: 110 MMHG | WEIGHT: 169 LBS | HEART RATE: 74 BPM

## 2024-10-23 DIAGNOSIS — G43.009 MIGRAINE WITHOUT AURA AND WITHOUT STATUS MIGRAINOSUS, NOT INTRACTABLE: Primary | ICD-10-CM

## 2024-10-23 PROCEDURE — 3066F NEPHROPATHY DOC TX: CPT | Mod: CPTII,S$GLB,, | Performed by: NURSE PRACTITIONER

## 2024-10-23 PROCEDURE — 3061F NEG MICROALBUMINURIA REV: CPT | Mod: CPTII,S$GLB,, | Performed by: NURSE PRACTITIONER

## 2024-10-23 PROCEDURE — 99999 PR PBB SHADOW E&M-EST. PATIENT-LVL III: CPT | Mod: PBBFAC,,, | Performed by: NURSE PRACTITIONER

## 2024-10-23 PROCEDURE — 3079F DIAST BP 80-89 MM HG: CPT | Mod: CPTII,S$GLB,, | Performed by: NURSE PRACTITIONER

## 2024-10-23 PROCEDURE — 1160F RVW MEDS BY RX/DR IN RCRD: CPT | Mod: CPTII,S$GLB,, | Performed by: NURSE PRACTITIONER

## 2024-10-23 PROCEDURE — 1159F MED LIST DOCD IN RCRD: CPT | Mod: CPTII,S$GLB,, | Performed by: NURSE PRACTITIONER

## 2024-10-23 PROCEDURE — 3074F SYST BP LT 130 MM HG: CPT | Mod: CPTII,S$GLB,, | Performed by: NURSE PRACTITIONER

## 2024-10-23 PROCEDURE — 3008F BODY MASS INDEX DOCD: CPT | Mod: CPTII,S$GLB,, | Performed by: NURSE PRACTITIONER

## 2024-10-23 PROCEDURE — 3044F HG A1C LEVEL LT 7.0%: CPT | Mod: CPTII,S$GLB,, | Performed by: NURSE PRACTITIONER

## 2024-10-23 PROCEDURE — 99214 OFFICE O/P EST MOD 30 MIN: CPT | Mod: S$GLB,,, | Performed by: NURSE PRACTITIONER

## 2024-10-23 RX ORDER — RIMEGEPANT SULFATE 75 MG/75MG
75 TABLET, ORALLY DISINTEGRATING ORAL
Qty: 16 TABLET | Refills: 5 | Status: SHIPPED | OUTPATIENT
Start: 2024-10-23

## 2024-10-23 NOTE — ASSESSMENT & PLAN NOTE
-Try treated as needed with nurtec. Triptans relatively contraindicated with HTN history, complex migraine features, and concurrent use of SSRI (risk for serotonin syndrome)

## 2024-10-23 NOTE — PROGRESS NOTES
Subjective:       Patient ID: Jenny Epps is a 64 y.o. female.    Chief Complaint: Headache (Did not fill prescription for nurtec, insurance would not pay for it. Really has not been having any headaches. May have had a 2 - 3 since her last office visit. States usually wakes up with a headache, lasting for 2 hours. Little nausea with headaches. Only had one episode of blurry vision in left eye.)      History of Present Illness:  Follow-up visit for migraines.  She had MRV after last visit which was normal.  Recall, she also had an MRI brain with and without contrast which just revealed mild chronic microvascular ischemia.  Since our last visit, she has only had 2-3 migraines, but 1 of the migraines again had the associated blurred vision in the left eye as well as left facial numbness.  This was a very severe migraine.  When she does have the migraines, she typically wakes up with them.  She did not get the Nurtec after last visit because [facility] said they could not fill it.            Past Medical History:   Diagnosis Date    Anxiety     Depression     GERD (gastroesophageal reflux disease)     History of hepatitis C     Hypertension     Personal history of colonic polyps 2018       Past Surgical History:   Procedure Laterality Date     SECTION      COLONOSCOPY  2018    SPINE SURGERY  1998        Family History   Adopted: Yes   Problem Relation Name Age of Onset    No Known Problems Mother      No Known Problems Father          Social History     Socioeconomic History    Marital status:    Tobacco Use    Smoking status: Former     Current packs/day: 0.25     Average packs/day: 0.3 packs/day for 5.2 years (1.3 ttl pk-yrs)     Types: Cigarettes    Smokeless tobacco: Never   Substance and Sexual Activity    Alcohol use: Yes     Alcohol/week: 2.0 standard drinks of alcohol     Types: 2 Shots of liquor per week    Drug use: Never    Sexual activity: Not Currently     Partners: Male      Birth control/protection: None     Social Drivers of Health     Financial Resource Strain: Low Risk  (9/25/2024)    Overall Financial Resource Strain (CARDIA)     Difficulty of Paying Living Expenses: Not hard at all   Food Insecurity: No Food Insecurity (9/25/2024)    Hunger Vital Sign     Worried About Running Out of Food in the Last Year: Never true     Ran Out of Food in the Last Year: Never true   Physical Activity: Unknown (9/25/2024)    Exercise Vital Sign     Days of Exercise per Week: 3 days   Stress: No Stress Concern Present (9/25/2024)    [employer] - Occupational Stress Questionnaire     Feeling of Stress : Only a little   Housing Stability: Unknown (9/25/2024)    Housing Stability Vital Sign     Unable to Pay for Housing in the Last Year: No        Outpatient Encounter Medications as of 10/23/2024   Medication Sig Dispense Refill    aluminum & magnesium hydroxide-simethicone (MYLANTA MAX STRENGTH) 400-400-40 mg/5 mL suspension Take 15 mLs by mouth every 6 (six) hours as needed.      diclofenac sodium (VOLTAREN) 1 % Gel Apply 2 g topically 4 (four) times daily. 100 g 0    EScitalopram oxalate (LEXAPRO) 10 MG tablet Take 1 tablet (10 mg total) by mouth once daily. 90 tablet 3    gabapentin (NEURONTIN) 100 MG capsule Take 1 capsule (100 mg total) by mouth 3 (three) times daily as needed (nerve pain). 90 capsule 0    olmesartan-hydrochlorothiazide (BENICAR HCT) 20-12.5 mg per tablet Take 1 tablet by mouth once daily. 30 tablet 5    pantoprazole (PROTONIX) 40 MG tablet Take 40 mg by mouth every morning.      semaglutide (OZEMPIC) 0.25 mg or 0.5 mg (2 mg/3 mL) pen injector Inject 0.5 mg into the skin every 7 days. 12 each 0    sucralfate (CARAFATE) 1 gram tablet Take 1 g by mouth as needed.      rimegepant (NURTEC) 75 mg odt Take 1 tablet (75 mg total) by mouth as needed for Migraine. Place ODT tablet on the tongue; alternatively the ODT tablet may be placed under the tongue 16  "tablet 5     No facility-administered encounter medications on file as of 10/23/2024.      Objective:   /84 (BP Location: Left arm, Patient Position: Sitting)   Pulse 74   Ht 5' 5" (1.651 m)   Wt 76.7 kg (169 lb)   BMI 28.12 kg/m²        Physical Exam  General:  Alert and oriented  NAD  No overt edema    Cognition and Comprehension:  Speech and language intact  Follows commands    Cranial nerves:   CN 2_ Visual fields (full to confrontation both eyes)  CN 3, 4, 6_ Intact, KAREN, no nystagmus  CN 7_no face asymmetry; normal eye closure and smile  CN 8_hearing intact to spoken voice  CN 9, 10, 11_voice normal, shoulder shrug ok; deltoids not fatigable   CN 12 tongue_protrudes mid line    Muscle Strength and Tone:  Normal upper extremity tone  Normal lower extremity tone  Normal upper extremity strength  Normal lower extremity strength    Reflexes:  Normal and symmetric    Sensation:  Intact to light touch and temperature    Coordination and Gait:  Coordination and gait are normal   No ataxia      Assessment & Plan:      1. Migraine without aura and without status migrainosus, not intractable  Assessment & Plan:  -Try treated as needed with nurtec. Triptans relatively contraindicated with HTN history, complex migraine features, and concurrent use of SSRI (risk for serotonin syndrome)     Orders:  -     rimegepant (NURTEC) 75 mg odt; Take 1 tablet (75 mg total) by mouth as needed for Migraine. Place ODT tablet on the tongue; alternatively the ODT tablet may be placed under the tongue  Dispense: 16 tablet; Refill: 5          This note was created with the assistance of voice recognition software. There may be transcription errors as a result of using this technology however minimal. Effort has been made to assure accuracy of transcription but any obvious errors or omissions should be clarified with the author of the document.      "

## 2024-10-25 ENCOUNTER — PATIENT MESSAGE (OUTPATIENT)
Dept: ORTHOPEDICS | Facility: CLINIC | Age: 64
End: 2024-10-25
Payer: COMMERCIAL

## 2024-10-28 ENCOUNTER — OFFICE VISIT (OUTPATIENT)
Dept: ORTHOPEDICS | Facility: CLINIC | Age: 64
End: 2024-10-28
Payer: COMMERCIAL

## 2024-10-28 VITALS
SYSTOLIC BLOOD PRESSURE: 124 MMHG | HEIGHT: 65 IN | BODY MASS INDEX: 28.49 KG/M2 | WEIGHT: 171 LBS | DIASTOLIC BLOOD PRESSURE: 78 MMHG | HEART RATE: 67 BPM

## 2024-10-28 DIAGNOSIS — M70.62 TROCHANTERIC BURSITIS OF LEFT HIP: Primary | ICD-10-CM

## 2024-10-28 PROCEDURE — 1159F MED LIST DOCD IN RCRD: CPT | Mod: CPTII,,, | Performed by: PHYSICIAN ASSISTANT

## 2024-10-28 PROCEDURE — 3078F DIAST BP <80 MM HG: CPT | Mod: CPTII,,, | Performed by: PHYSICIAN ASSISTANT

## 2024-10-28 PROCEDURE — 1160F RVW MEDS BY RX/DR IN RCRD: CPT | Mod: CPTII,,, | Performed by: PHYSICIAN ASSISTANT

## 2024-10-28 PROCEDURE — 3008F BODY MASS INDEX DOCD: CPT | Mod: CPTII,,, | Performed by: PHYSICIAN ASSISTANT

## 2024-10-28 PROCEDURE — 3061F NEG MICROALBUMINURIA REV: CPT | Mod: CPTII,,, | Performed by: PHYSICIAN ASSISTANT

## 2024-10-28 PROCEDURE — 3044F HG A1C LEVEL LT 7.0%: CPT | Mod: CPTII,,, | Performed by: PHYSICIAN ASSISTANT

## 2024-10-28 PROCEDURE — 3074F SYST BP LT 130 MM HG: CPT | Mod: CPTII,,, | Performed by: PHYSICIAN ASSISTANT

## 2024-10-28 PROCEDURE — 3066F NEPHROPATHY DOC TX: CPT | Mod: CPTII,,, | Performed by: PHYSICIAN ASSISTANT

## 2024-10-28 PROCEDURE — 99213 OFFICE O/P EST LOW 20 MIN: CPT | Mod: 25,,, | Performed by: PHYSICIAN ASSISTANT

## 2024-10-28 PROCEDURE — 20610 DRAIN/INJ JOINT/BURSA W/O US: CPT | Mod: LT,,, | Performed by: PHYSICIAN ASSISTANT

## 2024-10-28 RX ORDER — LIDOCAINE HYDROCHLORIDE 20 MG/ML
2 INJECTION, SOLUTION INFILTRATION; PERINEURAL
Status: DISCONTINUED | OUTPATIENT
Start: 2024-10-28 | End: 2024-10-28 | Stop reason: HOSPADM

## 2024-10-28 RX ORDER — BETAMETHASONE SODIUM PHOSPHATE AND BETAMETHASONE ACETATE 3; 3 MG/ML; MG/ML
12 INJECTION, SUSPENSION INTRA-ARTICULAR; INTRALESIONAL; INTRAMUSCULAR; SOFT TISSUE
Status: DISCONTINUED | OUTPATIENT
Start: 2024-10-28 | End: 2024-10-28 | Stop reason: HOSPADM

## 2024-10-28 RX ORDER — LIDOCAINE HYDROCHLORIDE 20 MG/ML
5 INJECTION, SOLUTION EPIDURAL; INFILTRATION; INTRACAUDAL; PERINEURAL
Status: DISCONTINUED | OUTPATIENT
Start: 2024-10-28 | End: 2024-10-28 | Stop reason: HOSPADM

## 2024-10-28 RX ADMIN — LIDOCAINE HYDROCHLORIDE 5 ML: 20 INJECTION, SOLUTION EPIDURAL; INFILTRATION; INTRACAUDAL; PERINEURAL at 02:10

## 2024-10-28 RX ADMIN — LIDOCAINE HYDROCHLORIDE 2 ML: 20 INJECTION, SOLUTION INFILTRATION; PERINEURAL at 02:10

## 2024-10-28 RX ADMIN — BETAMETHASONE SODIUM PHOSPHATE AND BETAMETHASONE ACETATE 12 MG: 3; 3 INJECTION, SUSPENSION INTRA-ARTICULAR; INTRALESIONAL; INTRAMUSCULAR; SOFT TISSUE at 02:10

## 2024-12-06 ENCOUNTER — PATIENT OUTREACH (OUTPATIENT)
Facility: CLINIC | Age: 64
End: 2024-12-06
Payer: COMMERCIAL

## 2024-12-06 NOTE — PROGRESS NOTES
Health Maintenance Topic(s) Outreach Outcomes & Actions Taken:       Additional Notes:  Rehyperlinked DM eye exam dated 2/23/23

## 2024-12-11 ENCOUNTER — DOCUMENTATION ONLY (OUTPATIENT)
Facility: CLINIC | Age: 64
End: 2024-12-11
Payer: COMMERCIAL

## 2025-01-03 DIAGNOSIS — E11.9 TYPE 2 DIABETES MELLITUS WITHOUT COMPLICATION, WITHOUT LONG-TERM CURRENT USE OF INSULIN: Primary | ICD-10-CM

## 2025-01-03 RX ORDER — SEMAGLUTIDE 0.68 MG/ML
0.5 INJECTION, SOLUTION SUBCUTANEOUS
Qty: 3 EACH | Refills: 3 | Status: SHIPPED | OUTPATIENT
Start: 2025-01-03

## 2025-01-28 DIAGNOSIS — M70.62 TROCHANTERIC BURSITIS OF LEFT HIP: Primary | ICD-10-CM

## 2025-01-28 DIAGNOSIS — M75.112 NONTRAUMATIC INCOMPLETE TEAR OF LEFT ROTATOR CUFF: ICD-10-CM

## 2025-01-28 DIAGNOSIS — M51.360 DEGENERATION OF INTERVERTEBRAL DISC OF LUMBAR REGION WITH DISCOGENIC BACK PAIN: ICD-10-CM

## 2025-02-07 DIAGNOSIS — I10 PRIMARY HYPERTENSION: Primary | ICD-10-CM

## 2025-02-07 RX ORDER — OLMESARTAN MEDOXOMIL AND HYDROCHLOROTHIAZIDE 20/12.5 20; 12.5 MG/1; MG/1
1 TABLET ORAL
Qty: 90 TABLET | Refills: 3 | Status: SHIPPED | OUTPATIENT
Start: 2025-02-07

## 2025-03-14 ENCOUNTER — OFFICE VISIT (OUTPATIENT)
Dept: ORTHOPEDICS | Facility: CLINIC | Age: 65
End: 2025-03-14
Payer: COMMERCIAL

## 2025-03-14 DIAGNOSIS — M70.62 TROCHANTERIC BURSITIS OF LEFT HIP: ICD-10-CM

## 2025-03-14 DIAGNOSIS — G57.02 PIRIFORMIS SYNDROME OF LEFT SIDE: Primary | ICD-10-CM

## 2025-03-14 PROCEDURE — 3044F HG A1C LEVEL LT 7.0%: CPT | Mod: CPTII,,, | Performed by: PHYSICIAN ASSISTANT

## 2025-03-14 PROCEDURE — 1160F RVW MEDS BY RX/DR IN RCRD: CPT | Mod: CPTII,,, | Performed by: PHYSICIAN ASSISTANT

## 2025-03-14 PROCEDURE — 1159F MED LIST DOCD IN RCRD: CPT | Mod: CPTII,,, | Performed by: PHYSICIAN ASSISTANT

## 2025-03-14 PROCEDURE — 99213 OFFICE O/P EST LOW 20 MIN: CPT | Mod: ,,, | Performed by: PHYSICIAN ASSISTANT

## 2025-03-14 RX ORDER — METHYLPREDNISOLONE 4 MG/1
TABLET ORAL
Qty: 1 EACH | Refills: 0 | Status: SHIPPED | OUTPATIENT
Start: 2025-03-14

## 2025-03-14 NOTE — PROGRESS NOTES
"Chief Complaint:   Chief Complaint   Patient presents with    Hip Pain     L hip - last injection 10/28/24 w/ relief. Reports on/off pain about 2 mths ago. Reports increase pain the last 2 wks ago. Requestin injection today        History of present illness:    This is a 65 y.o. year old female who complains of continued left lateral hip pain  History of Present Illness    CHIEF COMPLAINT:  - Left hip pain    HPI:  Jenny presents for a follow-up evaluation of left hip pain. She reports pain primarily on the side of her hip, not in the groin or back, though she mentions some back tightness. Pain is localized in the gluteal region when her leg is manipulated, and the area of most discomfort is in the sciatic notch region, rather than the hip bursa where she previously received an injection. She denies significant pain in the right hip or severe pain when the hip bone is pressed. She also denies any formal medical diagnoses. Corticosteroid injection in the hip (likely in the bursa): A few months ago, provided 60-70% improvement and lasted about a month.  Physical therapy for the hip at "[facility]": Currently ongoing, stretches and exercises are reported to be helpful.         Current Outpatient Medications   Medication Sig    aluminum & magnesium hydroxide-simethicone (MYLANTA MAX STRENGTH) 400-400-40 mg/5 mL suspension Take 15 mLs by mouth every 6 (six) hours as needed.    diclofenac sodium (VOLTAREN) 1 % Gel Apply 2 g topically 4 (four) times daily.    EScitalopram oxalate (LEXAPRO) 10 MG tablet Take 1 tablet (10 mg total) by mouth once daily.    gabapentin (NEURONTIN) 100 MG capsule Take 1 capsule (100 mg total) by mouth 3 (three) times daily as needed (nerve pain).    olmesartan-hydrochlorothiazide (BENICAR HCT) 20-12.5 mg per tablet TAKE 1 TABLET BY MOUTH EVERY DAY    pantoprazole (PROTONIX) 40 MG tablet Take 40 mg by mouth every morning.    rimegepant (NURTEC) 75 mg odt Take 1 tablet (75 mg total) by " "mouth as needed for Migraine. Place ODT tablet on the tongue; alternatively the ODT tablet may be placed under the tongue    semaglutide (OZEMPIC) 0.25 mg or 0.5 mg (2 mg/3 mL) pen injector INJECT 0.5 MG INTO THE SKIN EVERY 7 DAYS.    sucralfate (CARAFATE) 1 gram tablet Take 1 g by mouth as needed.    methylPREDNISolone (MEDROL DOSEPACK) 4 mg tablet use as directed     No current facility-administered medications for this visit.       Review of Systems:    Constitution:   Denies chills, fever, and sweats.  HENT:   Denies headaches or blurry vision.  Cardiovascular:  Denies chest pain or irregular heart beat.  Respiratory:   Denies cough or shortness of breath.  Gastrointestinal:  Denies abdominal pain, nausea, or vomiting.  Musculoskeletal:   Denies muscle cramps.  Neurological:   Denies dizziness or focal weakness.  Psychiatric/Behavior: Normal mental status.  Hematology/Lymph:  Denies bleeding problem or easy bruising/bleeding.  Skin:    Denies rash or suspicious lesions.    Examination:    Vital Signs:    Vitals:    03/14/25 0906   BP: (!) (P) 144/87   Pulse: (P) 60   Weight: (P) 77.6 kg (171 lb 1.2 oz)   Height: (P) 5' 5" (1.651 m)       Body mass index is 28.47 kg/m² (pended).    Constitution:   Well-developed, well nourished patient in no acute distress.  Neurological:   Alert and oriented x 3 and cooperative to examination.     Psychiatric/Behavior: Normal mental status.  Respiratory:   No shortness of breath.  Eyes:    Extraoccular muscles intact  Skin:    No scars, rash or suspicious lesions.    Physical Exam:   Hip Exam:  Left  No obvious deformity.   Flexion to 120 degrees and internal and external rotation to 40 degrees.   Abduction to 45 degree and adduction to 30 degrees.   Negative DERICK test.   Negative Stinchfield test.   No flexion contracture.   Mild  greater trochanteric tenderness.   Negative MELLISSA test.   5/5 strength, normal skin appearance and palpable pulses distally. Sensibility normal.  The " patient has tenderness to palpation in the posterior buttock in his sciatic notch area and in the area of the piriformis muscle.    She does have a positive piriformis stretch test on the left           Assessment: Piriformis syndrome of left side  -     Ambulatory Referral/Consult to Physical Therapy; Future; Expected date: 03/21/2025  -     MRI Hip Without Contrast Left; Future; Expected date: 03/14/2025    Trochanteric bursitis of left hip  -     MRI Hip Without Contrast Left; Future; Expected date: 03/14/2025    Other orders  -     methylPREDNISolone (MEDROL DOSEPACK) 4 mg tablet; use as directed  Dispense: 1 each; Refill: 0         Plan:  Assessment & Plan    LEFT HIP PAIN:  - Ordered MRI Left Hip.    SCIATICA:  - Recommend oral cortisone treatment instead of an injection due to the location of the pain near the sciatic nerve.  - Started oral cortisone taper, beginning with multiple pills on the first day, tapering down to 1 pill over a week.    FOLLOW-UP:  - Follow up after MRI results are available.  - Contact the office when MRI results are ready to discuss findings and treatment plan.       The patient is like to have an MRI of the left hip to rule out any other type of pathology I think this is acceptable at this time.    Of the MRI of the hip comes back negative we will consider an of formal workup of her lumbar spine        DISCLAIMER: This note may have been dictated using voice recognition software and may contain grammatical errors.     NOTE: Consult report sent to referring provider via [employer].

## 2025-03-17 ENCOUNTER — RESULTS FOLLOW-UP (OUTPATIENT)
Dept: FAMILY MEDICINE | Facility: CLINIC | Age: 65
End: 2025-03-17

## 2025-03-17 ENCOUNTER — PATIENT MESSAGE (OUTPATIENT)
Dept: FAMILY MEDICINE | Facility: CLINIC | Age: 65
End: 2025-03-17
Payer: COMMERCIAL

## 2025-03-17 ENCOUNTER — LAB VISIT (OUTPATIENT)
Dept: LAB | Facility: HOSPITAL | Age: 65
End: 2025-03-17
Attending: INTERNAL MEDICINE
Payer: COMMERCIAL

## 2025-03-17 ENCOUNTER — PATIENT MESSAGE (OUTPATIENT)
Dept: ORTHOPEDICS | Facility: CLINIC | Age: 65
End: 2025-03-17
Payer: COMMERCIAL

## 2025-03-17 DIAGNOSIS — I10 PRIMARY HYPERTENSION: ICD-10-CM

## 2025-03-17 DIAGNOSIS — E55.9 VITAMIN D DEFICIENCY: Primary | ICD-10-CM

## 2025-03-17 DIAGNOSIS — E11.9 TYPE 2 DIABETES MELLITUS WITHOUT COMPLICATION, WITHOUT LONG-TERM CURRENT USE OF INSULIN: ICD-10-CM

## 2025-03-17 DIAGNOSIS — F32.A ANXIETY AND DEPRESSION: ICD-10-CM

## 2025-03-17 DIAGNOSIS — R51.9 NEW ONSET OF HEADACHES AFTER AGE 50: ICD-10-CM

## 2025-03-17 DIAGNOSIS — E78.5 HYPERLIPIDEMIA, UNSPECIFIED HYPERLIPIDEMIA TYPE: ICD-10-CM

## 2025-03-17 DIAGNOSIS — I20.81 ANGINA PECTORIS WITH CORONARY MICROVASCULAR DYSFUNCTION: ICD-10-CM

## 2025-03-17 DIAGNOSIS — F41.9 ANXIETY AND DEPRESSION: ICD-10-CM

## 2025-03-17 DIAGNOSIS — M54.16 LUMBAR RADICULOPATHY: Primary | ICD-10-CM

## 2025-03-17 DIAGNOSIS — R00.1 SEVERE SINUS BRADYCARDIA: Primary | ICD-10-CM

## 2025-03-17 DIAGNOSIS — E55.9 VITAMIN D DEFICIENCY: ICD-10-CM

## 2025-03-17 LAB
25(OH)D3+25(OH)D2 SERPL-MCNC: 45 NG/ML (ref 30–80)
CHOLEST SERPL-MCNC: 231 MG/DL
CHOLEST/HDLC SERPL: 3 {RATIO} (ref 0–5)
ERYTHROCYTE [DISTWIDTH] IN BLOOD BY AUTOMATED COUNT: 14.1 % (ref 11.5–17)
ERYTHROCYTE [SEDIMENTATION RATE] IN BLOOD: 4 MM/HR (ref 0–30)
EST. AVERAGE GLUCOSE BLD GHB EST-MCNC: 119.8 MG/DL
HBA1C MFR BLD: 5.8 %
HCT VFR BLD AUTO: 36.6 % (ref 37–47)
HDLC SERPL-MCNC: 67 MG/DL (ref 35–60)
HGB BLD-MCNC: 12 G/DL (ref 12–16)
LDLC SERPL CALC-MCNC: 151 MG/DL (ref 50–140)
MCH RBC QN AUTO: 30.2 PG (ref 27–31)
MCHC RBC AUTO-ENTMCNC: 32.8 G/DL (ref 33–36)
MCV RBC AUTO: 92 FL (ref 80–94)
NRBC BLD AUTO-RTO: 0 %
PLATELET # BLD AUTO: 236 X10(3)/MCL (ref 130–400)
PMV BLD AUTO: 9.8 FL (ref 7.4–10.4)
RBC # BLD AUTO: 3.98 X10(6)/MCL (ref 4.2–5.4)
TRIGL SERPL-MCNC: 67 MG/DL (ref 37–140)
TSH SERPL-ACNC: 2.88 UIU/ML (ref 0.35–4.94)
VLDLC SERPL CALC-MCNC: 13 MG/DL
WBC # BLD AUTO: 6.87 X10(3)/MCL (ref 4.5–11.5)

## 2025-03-17 PROCEDURE — 85027 COMPLETE CBC AUTOMATED: CPT

## 2025-03-17 PROCEDURE — 83036 HEMOGLOBIN GLYCOSYLATED A1C: CPT

## 2025-03-17 PROCEDURE — 80061 LIPID PANEL: CPT

## 2025-03-17 PROCEDURE — 80053 COMPREHEN METABOLIC PANEL: CPT

## 2025-03-17 PROCEDURE — 36415 COLL VENOUS BLD VENIPUNCTURE: CPT

## 2025-03-17 PROCEDURE — 85652 RBC SED RATE AUTOMATED: CPT

## 2025-03-17 PROCEDURE — 84443 ASSAY THYROID STIM HORMONE: CPT

## 2025-03-17 PROCEDURE — 82306 VITAMIN D 25 HYDROXY: CPT

## 2025-03-17 NOTE — TELEPHONE ENCOUNTER
Can we contact the lab to see if these orders can be added for the patient?    Orders Placed This Encounter   Procedures    Hemoglobin A1C     Standing Status:   Future     Expected Date:   3/17/2025     Expiration Date:   6/17/2025    Microalbumin/Creatinine Ratio, Urine     Standing Status:   Future     Expected Date:   3/17/2025     Expiration Date:   5/16/2026     Specimen Source:   Urine    Comprehensive Metabolic Panel     Standing Status:   Future     Expected Date:   3/17/2025     Expiration Date:   6/17/2025    TSH     Standing Status:   Future     Expected Date:   3/17/2025     Expiration Date:   6/17/2025    Vitamin D     Standing Status:   Future     Expected Date:   3/17/2025     Expiration Date:   6/17/2025

## 2025-03-18 ENCOUNTER — OFFICE VISIT (OUTPATIENT)
Dept: FAMILY MEDICINE | Facility: CLINIC | Age: 65
End: 2025-03-18
Payer: COMMERCIAL

## 2025-03-18 VITALS
SYSTOLIC BLOOD PRESSURE: 130 MMHG | HEART RATE: 63 BPM | BODY MASS INDEX: 29.12 KG/M2 | DIASTOLIC BLOOD PRESSURE: 82 MMHG | WEIGHT: 174.81 LBS | HEIGHT: 65 IN | TEMPERATURE: 98 F | RESPIRATION RATE: 18 BRPM | OXYGEN SATURATION: 99 %

## 2025-03-18 DIAGNOSIS — K21.9 GASTROESOPHAGEAL REFLUX DISEASE WITHOUT ESOPHAGITIS: ICD-10-CM

## 2025-03-18 DIAGNOSIS — F32.A ANXIETY AND DEPRESSION: ICD-10-CM

## 2025-03-18 DIAGNOSIS — G43.009 MIGRAINE WITHOUT AURA AND WITHOUT STATUS MIGRAINOSUS, NOT INTRACTABLE: ICD-10-CM

## 2025-03-18 DIAGNOSIS — F41.9 ANXIETY AND DEPRESSION: ICD-10-CM

## 2025-03-18 DIAGNOSIS — E78.2 MIXED HYPERLIPIDEMIA: ICD-10-CM

## 2025-03-18 DIAGNOSIS — E11.9 TYPE 2 DIABETES MELLITUS WITHOUT COMPLICATION, WITHOUT LONG-TERM CURRENT USE OF INSULIN: ICD-10-CM

## 2025-03-18 DIAGNOSIS — Z00.00 WELLNESS EXAMINATION: Primary | ICD-10-CM

## 2025-03-18 DIAGNOSIS — I10 PRIMARY HYPERTENSION: ICD-10-CM

## 2025-03-18 PROBLEM — B35.1 TOENAIL FUNGUS: Status: RESOLVED | Noted: 2024-09-25 | Resolved: 2025-03-18

## 2025-03-18 LAB
ALBUMIN SERPL-MCNC: 4.1 G/DL (ref 3.4–4.8)
ALBUMIN/GLOB SERPL: 1.4 RATIO (ref 1.1–2)
ALP SERPL-CCNC: 77 UNIT/L (ref 40–150)
ALT SERPL-CCNC: 11 UNIT/L (ref 0–55)
ANION GAP SERPL CALC-SCNC: 6 MEQ/L
AST SERPL-CCNC: 21 UNIT/L (ref 5–34)
BILIRUB SERPL-MCNC: 0.4 MG/DL
BUN SERPL-MCNC: 18.1 MG/DL (ref 9.8–20.1)
CALCIUM SERPL-MCNC: 9.1 MG/DL (ref 8.4–10.2)
CHLORIDE SERPL-SCNC: 104 MMOL/L (ref 98–107)
CO2 SERPL-SCNC: 30 MMOL/L (ref 23–31)
CREAT SERPL-MCNC: 0.96 MG/DL (ref 0.55–1.02)
CREAT/UREA NIT SERPL: 19
GFR SERPLBLD CREATININE-BSD FMLA CKD-EPI: >60 ML/MIN/1.73/M2
GLOBULIN SER-MCNC: 2.9 GM/DL (ref 2.4–3.5)
GLUCOSE SERPL-MCNC: 92 MG/DL (ref 82–115)
POTASSIUM SERPL-SCNC: 4.3 MMOL/L (ref 3.5–5.1)
PROT SERPL-MCNC: 7 GM/DL (ref 5.8–7.6)
SODIUM SERPL-SCNC: 140 MMOL/L (ref 136–145)

## 2025-03-18 PROCEDURE — 1160F RVW MEDS BY RX/DR IN RCRD: CPT | Mod: CPTII,,, | Performed by: NURSE PRACTITIONER

## 2025-03-18 PROCEDURE — 3044F HG A1C LEVEL LT 7.0%: CPT | Mod: CPTII,,, | Performed by: NURSE PRACTITIONER

## 2025-03-18 PROCEDURE — 1159F MED LIST DOCD IN RCRD: CPT | Mod: CPTII,,, | Performed by: NURSE PRACTITIONER

## 2025-03-18 PROCEDURE — 3075F SYST BP GE 130 - 139MM HG: CPT | Mod: CPTII,,, | Performed by: NURSE PRACTITIONER

## 2025-03-18 PROCEDURE — 3079F DIAST BP 80-89 MM HG: CPT | Mod: CPTII,,, | Performed by: NURSE PRACTITIONER

## 2025-03-18 PROCEDURE — 99396 PREV VISIT EST AGE 40-64: CPT | Mod: ,,, | Performed by: NURSE PRACTITIONER

## 2025-03-18 PROCEDURE — 3008F BODY MASS INDEX DOCD: CPT | Mod: CPTII,,, | Performed by: NURSE PRACTITIONER

## 2025-03-18 NOTE — ASSESSMENT & PLAN NOTE
The 10-year ASCVD risk score (Laura HUYNH, et al., 2019) is: 20.2%    Values used to calculate the score:      Age: 65 years      Sex: Female      Is Non- : Yes      Diabetic: Yes      Tobacco smoker: No      Systolic Blood Pressure: 130 mmHg      Is BP treated: Yes      HDL Cholesterol: 67 mg/dL      Total Cholesterol: 231 mg/dL    Declines Statin  Keep appt with PCP for follow up

## 2025-03-18 NOTE — ASSESSMENT & PLAN NOTE
Labs previously done and reviewed with patient  MMG due 6/2025; pt will schedule an appt in June  DM eye exam due: Will call Ophthalmologist for appt  DM Microalbumin due: will complete at [facility]   DM foot exam:  In office today  Colonoscopy 2018; + Polyps; Repeat Colonoscopy due; will call Dr. Zazueta for colonoscopy  Tdap, Pneumovax, Shingles, and RSV due; Declines all vaccines

## 2025-03-18 NOTE — PROGRESS NOTES
Subjective:      Patient ID: Jenny Epps is a 65 y.o. Black or  female      Chief Complaint: Annual Exam (Wellness) and Lab Results       Past Medical History:   Diagnosis Date    Anxiety     Depression     GERD (gastroesophageal reflux disease)     History of hepatitis C     Hypertension     Personal history of colonic polyps 09/19/2018    Toenail fungus 09/25/2024        HPI  Presents to clinic for Annual Wellness exam.  Denies any acute problems.    Health Maintenance         Date Due Completion Date    Low Dose Statin Never done ---    Colorectal Cancer Screening 09/19/2021 9/19/2018    Diabetic Eye Exam 02/23/2024 2/23/2023    COVID-19 Vaccine (2 - 2024-25 season) 09/01/2024 3/5/2021    Diabetes Urine Screening 02/02/2025 2/2/2024    Mammogram 06/13/2025 6/13/2024    RSV Vaccine (Age 60+ and Pregnant patients) (1 - Risk 60-74 years 1-dose series) 03/18/2025 (Originally 2/8/2022) ---    TETANUS VACCINE 03/18/2026 (Originally 5/16/2001) 5/16/1991    Shingles Vaccine (1 of 2) 03/18/2026 (Originally 2/8/2012) ---    Pneumococcal Vaccines (Age 50+) (1 of 2 - PCV) 03/18/2026 (Originally 2/8/1981) 5/1/2014    Hemoglobin A1c 09/17/2025 3/17/2025    Cervical Cancer Screening 11/07/2025 11/7/2024    Lipid Panel 03/17/2026 3/17/2025    Foot Exam 03/18/2026 3/18/2025 (Done)    Override on 3/18/2025: Done    Override on 2/27/2024: Done    Override on 2/15/2023: Done        Labs previously done and reviewed with patient  MMG due 6/2025; pt will schedule an appt in June  DM eye exam due: Will call Ophthalmologist for appt  DM Microalbumin due: will complete at [facility]   DM foot exam:  In office today  Colonoscopy 2018; + Polyps; Repeat Colonoscopy due; will call Dr. Zazueta for colonoscopy  Tdap, Pneumovax, Shingles, and RSV due; Declines all vaccines      No previous provider found        Patient Care Team:  Janeen Mckay DO as PCP - General  Gregory Zazueta MD as Consulting Physician  (Gastroenterology)  Delbert Giron MD as Consulting Physician (Cardiology)  Cee Lombardi MD as Consulting Physician (Obstetrics and Gynecology)      Review of Systems   Constitutional:  Positive for activity change. Negative for appetite change, chills, fever and unexpected weight change.   HENT: Negative.  Negative for hearing loss, rhinorrhea and trouble swallowing.    Eyes: Negative.  Negative for discharge, redness and visual disturbance.   Respiratory: Negative.  Negative for chest tightness, shortness of breath and wheezing.    Cardiovascular: Negative.  Negative for chest pain and palpitations.   Gastrointestinal: Negative.  Negative for blood in stool, constipation, diarrhea and vomiting.   Endocrine: Negative.  Negative for polydipsia and polyuria.   Genitourinary: Negative.  Negative for difficulty urinating, dysuria, hematuria and menstrual problem.   Musculoskeletal:  Positive for arthralgias and joint swelling. Negative for neck pain.   Integumentary:  Negative.   Allergic/Immunologic: Negative.    Neurological:  Positive for headaches. Negative for weakness.   Psychiatric/Behavioral: Negative.  Negative for confusion and dysphoric mood.    All other systems reviewed and are negative.          Objective:      Vitals:    03/18/25 1257   BP: 130/82   Pulse:    Resp:    Temp:       Body mass index is 29.09 kg/m².     Physical Exam  Vitals reviewed.   Constitutional:       Appearance: Normal appearance.   HENT:      Head: Normocephalic.      Mouth/Throat:      Mouth: Mucous membranes are moist.   Eyes:      Conjunctiva/sclera: Conjunctivae normal.      Pupils: Pupils are equal, round, and reactive to light.   Cardiovascular:      Rate and Rhythm: Normal rate and regular rhythm.   Pulmonary:      Effort: Pulmonary effort is normal. No respiratory distress.      Breath sounds: Normal breath sounds.   Musculoskeletal:         General: Normal range of motion.      Cervical back: Normal range of motion and  neck supple.   Feet:      Comments: Diabetic foot exam:   Left: Sharp/dull discrimination normal   Filament test present  Right: Sharp/dull discrimination normal   Filament test present    General Appearance: dry, warm, skin intact, appropriate hair growth,   Toe Nails: no thick or overgrown toenails  Posterior Tibial Pulse: 2+ bilaterally  Dorsalis Pedis Pulse: 2+ bilaterally  Capillary Refill: < 2 sec bilaterally  Presence of Ulcer: No ulcers bilaterally  Monofilament Testing: Normal, no evidence of peripheral neuropathy bilaterally       Skin:     General: Skin is warm and dry.   Neurological:      Mental Status: She is alert and oriented to person, place, and time.   Psychiatric:         Mood and Affect: Mood normal.          Current Medications[1]    Assessment & Plan:     Problem List Items Addressed This Visit          Neuro    Migraine without aura and without status migrainosus, not intractable    Stable  Continue Nurtec as prescribed  Keep appt with PCP for follow up              Psychiatric    Anxiety and depression    Stable  Continue Lexapro as prescribed  Keep appt with PCP for follow up            Cardiac/Vascular    Primary hypertension    BP at goal  Continue current medication (Benicar HCT)  Daily BP check; bring log all clinic visits  Instructed to report to ED for any BP greater than 200/100, dizziness, syncope, CP, or SOB  Keep appt. With PCP for follow up  Patient is agreeable to plan and verbalizes understanding            Mixed hyperlipidemia    The 10-year ASCVD risk score (Laura DK, et al., 2019) is: 20.2%    Values used to calculate the score:      Age: 65 years      Sex: Female      Is Non- : Yes      Diabetic: Yes      Tobacco smoker: No      Systolic Blood Pressure: 130 mmHg      Is BP treated: Yes      HDL Cholesterol: 67 mg/dL      Total Cholesterol: 231 mg/dL    Declines Statin  Keep appt with PCP for follow up              Endocrine    Type 2 diabetes  mellitus without complication, without long-term current use of insulin    Stable; A1C is 5.8  Continue Ozempic as prescribed  Keep appt with PCP for follow-up         Relevant Orders    Microalbumin/Creatinine Ratio, Urine       GI    GERD (gastroesophageal reflux disease)    Stable  Keep appt with PCP for follow up              Other    Wellness examination - Primary    Labs previously done and reviewed with patient  MMG due 6/2025; pt will schedule an appt in June  DM eye exam due: Will call Ophthalmologist for appt  DM Microalbumin due: will complete at [facility]   DM foot exam:  In office today  Colonoscopy 2018; + Polyps; Repeat Colonoscopy due; will call Dr. Zazueta for colonoscopy  Tdap, Pneumovax, Shingles, and RSV due; Declines all vaccines                            [1]   Current Outpatient Medications:     aluminum & magnesium hydroxide-simethicone (MYLANTA MAX STRENGTH) 400-400-40 mg/5 mL suspension, Take 15 mLs by mouth every 6 (six) hours as needed., Disp: , Rfl:     diclofenac sodium (VOLTAREN) 1 % Gel, Apply 2 g topically 4 (four) times daily., Disp: 100 g, Rfl: 0    EScitalopram oxalate (LEXAPRO) 10 MG tablet, Take 1 tablet (10 mg total) by mouth once daily., Disp: 90 tablet, Rfl: 3    methylPREDNISolone (MEDROL DOSEPACK) 4 mg tablet, use as directed, Disp: 1 each, Rfl: 0    olmesartan-hydrochlorothiazide (BENICAR HCT) 20-12.5 mg per tablet, TAKE 1 TABLET BY MOUTH EVERY DAY, Disp: 90 tablet, Rfl: 3    pantoprazole (PROTONIX) 40 MG tablet, Take 40 mg by mouth every morning., Disp: , Rfl:     rimegepant (NURTEC) 75 mg odt, Take 1 tablet (75 mg total) by mouth as needed for Migraine. Place ODT tablet on the tongue; alternatively the ODT tablet may be placed under the tongue, Disp: 16 tablet, Rfl: 5    semaglutide (OZEMPIC) 0.25 mg or 0.5 mg (2 mg/3 mL) pen injector, INJECT 0.5 MG INTO THE SKIN EVERY 7 DAYS., Disp: 3 each, Rfl: 3    sucralfate (CARAFATE) 1 gram tablet, Take 1 g by mouth as  needed., Disp: , Rfl:

## 2025-04-03 ENCOUNTER — PATIENT MESSAGE (OUTPATIENT)
Dept: ORTHOPEDICS | Facility: CLINIC | Age: 65
End: 2025-04-03
Payer: COMMERCIAL

## 2025-04-07 ENCOUNTER — TELEPHONE (OUTPATIENT)
Dept: ORTHOPEDICS | Facility: CLINIC | Age: 65
End: 2025-04-07
Payer: COMMERCIAL

## 2025-04-07 DIAGNOSIS — M54.16 LUMBAR RADICULOPATHY: Primary | ICD-10-CM

## 2025-04-07 NOTE — TELEPHONE ENCOUNTER
Spoke to the patient today and discussed her MRI results with the.    Her hip MRI was essentially normal with some mild arthritic changes but her lumbar MRI shows some significant disc degeneration along with facet arthropathy and a previous surgery.    We will get her referred for interventional pain management eval to see if they can offer her some type of treatment to help with her symptoms

## 2025-04-07 NOTE — TELEPHONE ENCOUNTER
Attempted to call patient to let her know that her referral was sent to a pain management provider. Their office would be calling her to set up an appointment

## 2025-04-08 NOTE — TELEPHONE ENCOUNTER
Patient left a voicemail requesting a call back    Spoke with patient. She was informed that a referral to Dr. Fraga office has been sent. Their office will reach out to her to get an appointment    Patient voiced understanding.

## 2025-04-09 ENCOUNTER — PATIENT MESSAGE (OUTPATIENT)
Dept: ORTHOPEDICS | Facility: CLINIC | Age: 65
End: 2025-04-09
Payer: COMMERCIAL

## 2025-04-11 ENCOUNTER — TELEPHONE (OUTPATIENT)
Dept: HEMATOLOGY/ONCOLOGY | Facility: CLINIC | Age: 65
End: 2025-04-11
Payer: COMMERCIAL

## 2025-04-11 DIAGNOSIS — F41.9 ANXIETY AND DEPRESSION: Primary | ICD-10-CM

## 2025-04-11 DIAGNOSIS — F32.A ANXIETY AND DEPRESSION: Primary | ICD-10-CM

## 2025-04-20 ENCOUNTER — PATIENT MESSAGE (OUTPATIENT)
Dept: ORTHOPEDICS | Facility: CLINIC | Age: 65
End: 2025-04-20
Payer: COMMERCIAL

## 2025-04-29 ENCOUNTER — OFFICE VISIT (OUTPATIENT)
Dept: ORTHOPEDICS | Facility: CLINIC | Age: 65
End: 2025-04-29
Payer: COMMERCIAL

## 2025-04-29 VITALS
HEART RATE: 63 BPM | BODY MASS INDEX: 29.12 KG/M2 | DIASTOLIC BLOOD PRESSURE: 87 MMHG | SYSTOLIC BLOOD PRESSURE: 145 MMHG | HEIGHT: 65 IN | WEIGHT: 174.81 LBS

## 2025-04-29 DIAGNOSIS — M70.62 TROCHANTERIC BURSITIS OF LEFT HIP: Primary | ICD-10-CM

## 2025-04-29 PROCEDURE — 1160F RVW MEDS BY RX/DR IN RCRD: CPT | Mod: CPTII,,, | Performed by: PHYSICIAN ASSISTANT

## 2025-04-29 PROCEDURE — 3008F BODY MASS INDEX DOCD: CPT | Mod: CPTII,,, | Performed by: PHYSICIAN ASSISTANT

## 2025-04-29 PROCEDURE — 1159F MED LIST DOCD IN RCRD: CPT | Mod: CPTII,,, | Performed by: PHYSICIAN ASSISTANT

## 2025-04-29 PROCEDURE — 3044F HG A1C LEVEL LT 7.0%: CPT | Mod: CPTII,,, | Performed by: PHYSICIAN ASSISTANT

## 2025-04-29 PROCEDURE — 20610 DRAIN/INJ JOINT/BURSA W/O US: CPT | Mod: LT,,, | Performed by: PHYSICIAN ASSISTANT

## 2025-04-29 PROCEDURE — 3077F SYST BP >= 140 MM HG: CPT | Mod: CPTII,,, | Performed by: PHYSICIAN ASSISTANT

## 2025-04-29 PROCEDURE — 3079F DIAST BP 80-89 MM HG: CPT | Mod: CPTII,,, | Performed by: PHYSICIAN ASSISTANT

## 2025-04-29 PROCEDURE — 99214 OFFICE O/P EST MOD 30 MIN: CPT | Mod: 25,,, | Performed by: PHYSICIAN ASSISTANT

## 2025-04-29 RX ORDER — BETAMETHASONE SODIUM PHOSPHATE AND BETAMETHASONE ACETATE 3; 3 MG/ML; MG/ML
12 INJECTION, SUSPENSION INTRA-ARTICULAR; INTRALESIONAL; INTRAMUSCULAR; SOFT TISSUE
Status: DISCONTINUED | OUTPATIENT
Start: 2025-04-29 | End: 2025-04-29 | Stop reason: HOSPADM

## 2025-04-29 RX ORDER — LIDOCAINE HYDROCHLORIDE 20 MG/ML
2 INJECTION, SOLUTION INFILTRATION; PERINEURAL
Status: DISCONTINUED | OUTPATIENT
Start: 2025-04-29 | End: 2025-04-29 | Stop reason: HOSPADM

## 2025-04-29 RX ADMIN — LIDOCAINE HYDROCHLORIDE 2 MG: 20 INJECTION, SOLUTION INFILTRATION; PERINEURAL at 03:04

## 2025-04-29 RX ADMIN — BETAMETHASONE SODIUM PHOSPHATE AND BETAMETHASONE ACETATE 12 MG: 3; 3 INJECTION, SUSPENSION INTRA-ARTICULAR; INTRALESIONAL; INTRAMUSCULAR; SOFT TISSUE at 03:04

## 2025-04-29 NOTE — PROCEDURES
Large Joint Aspiration/Injection: L greater trochanteric bursa    Date/Time: 4/29/2025 3:00 PM    Performed by: Joseph Amaro PA-C  Authorized by: Joseph Amaro PA-C    Indications:  Pain  Timeout: prior to procedure the correct patient, procedure, and site was verified      Details:  Needle Size:  25 G  Location:  Hip  Site:  L greater trochanteric bursa  Medications:  2 mg LIDOcaine HCL 20 mg/ml (2%) 20 mg/mL (2 %); 12 mg betamethasone acetate-betamethasone sodium phosphate 6 mg/mL  Patient tolerance:  Patient tolerated the procedure well with no immediate complications

## 2025-04-29 NOTE — PROGRESS NOTES
Chief Complaint:   Chief Complaint   Patient presents with    Results     MRI results of L hip and lumbar spine.        History of present illness:    This is a 65 y.o. year old   History of Present Illness    CHIEF COMPLAINT:  - Left hip pain and lower back pain with associated left leg and foot symptoms.    HPI:  Jenny presents for follow-up regarding left hip and back pain radiating down her left leg to about mid-thigh, with tingling sensations in her left foot. Current pain levels are 8/10. Two weeks ago, the pain was severe. Pain wakes her at night, causing significant discomfort. She has difficulty lying on the affected side.    She has attempted PT but feels it was ineffective. She had one cortisone injection previously, which did not provide significant relief. She has a history of back surgery (L4-L5 fusion) without hardware.    She is trying to return to gym activities but has pain with certain movements. She typically uses the treadmill for exercise.    She denies being diabetic. Jenny had one corticosteroid injection in the hip, which provided minimal benefit.  Jenny underwent PT, but it was ineffective.    IMAGING:  - MRI Hip: Mild bursitis on the outside of the hip joint, with a small amount of fluid visible.  - MRI L Spine: Some narrowing of the foramina, particularly on the left side, but no severe compression. Some flattening of the spinal canal in certain areas, but nothing significant or severe.    SURGICAL HISTORY:  - L4-L5 fusion: No hardware placed.          Current Outpatient Medications   Medication Sig    aluminum & magnesium hydroxide-simethicone (MYLANTA MAX STRENGTH) 400-400-40 mg/5 mL suspension Take 15 mLs by mouth every 6 (six) hours as needed.    diclofenac sodium (VOLTAREN) 1 % Gel Apply 2 g topically 4 (four) times daily.    olmesartan-hydrochlorothiazide (BENICAR HCT) 20-12.5 mg per tablet TAKE 1 TABLET BY MOUTH EVERY DAY    pantoprazole (PROTONIX) 40 MG tablet Take 40 mg by mouth  "every morning.    rimegepant (NURTEC) 75 mg odt Take 1 tablet (75 mg total) by mouth as needed for Migraine. Place ODT tablet on the tongue; alternatively the ODT tablet may be placed under the tongue    semaglutide (OZEMPIC) 0.25 mg or 0.5 mg (2 mg/3 mL) pen injector INJECT 0.5 MG INTO THE SKIN EVERY 7 DAYS.    sucralfate (CARAFATE) 1 gram tablet Take 1 g by mouth as needed.    EScitalopram oxalate (LEXAPRO) 10 MG tablet Take 1 tablet (10 mg total) by mouth once daily.    methylPREDNISolone (MEDROL DOSEPACK) 4 mg tablet use as directed (Patient not taking: Reported on 4/29/2025)     No current facility-administered medications for this visit.       Review of Systems:    Constitution:   Denies chills, fever, and sweats.  HENT:   Denies headaches or blurry vision.  Cardiovascular:  Denies chest pain or irregular heart beat.  Respiratory:   Denies cough or shortness of breath.  Gastrointestinal:  Denies abdominal pain, nausea, or vomiting.  Musculoskeletal:   Denies muscle cramps.  Neurological:   Denies dizziness or focal weakness.  Psychiatric/Behavior: Normal mental status.  Hematology/Lymph:  Denies bleeding problem or easy bruising/bleeding.  Skin:    Denies rash or suspicious lesions.    Examination:    Vital Signs:    Vitals:    04/29/25 1501   BP: (!) 145/87   Pulse: 63   Weight: 79.3 kg (174 lb 13.2 oz)   Height: 5' 5" (1.651 m)       Body mass index is 29.09 kg/m².    Constitution:   Well-developed, well nourished patient in no acute distress.  Neurological:   Alert and oriented x 3 and cooperative to examination.     Psychiatric/Behavior: Normal mental status.  Respiratory:   No shortness of breath.  Eyes:    Extraoccular muscles intact  Skin:    No scars, rash or suspicious lesions.    Physical Exam:   Hip Exam:  Left  No obvious deformity.   Flexion to 120 degrees and internal and external rotation to 40 degrees.   Abduction to 45 degree and adduction to 30 degrees.   Negative DERICK test.   Negative " Stinchfield test.   No flexion contracture.   Positive  greater trochanteric tenderness.   Negative MELLISSA test.   5/5 strength, normal skin appearance and palpable pulses distally. Sensibility normal.    Imaging:  Reviewed MRI of the left hip and lumbar spine  Impression:     Moderate left gluteus minimus tendinosis/tendinitis with no associated tendon tear or bursitis.     Mild degenerative arthrosis at both hip joints.     Lower lumbar degenerative disc disease and bilateral degenerative facet arthrosis as well as chronic posterior postoperative change at the lower lumbar spine.     Two small uterine fibroids.        Impression:     Mild lumbar degenerative disc disease and mild to moderate lumbar degenerative facet arthrosis as detailed above with no acute pathology identified.     Post right hemilaminectomy L4-L5 and left hemilaminectomy at L5-S1 with no high-grade stenosis.     Assessment: Trochanteric bursitis of left hip  -     Large Joint Aspiration/Injection: L greater trochanteric bursa         Plan:    Assessment & Plan    LEFT HIP TROCHANTERIC BURSITIS:  - Left hip corticosteroid injection administered today for bursitis.  - Avoid repetitive lower extremity lifting, such as using stair master.  - Use elliptical machine instead of treadmill if possible.  - If using treadmill, walk without incline to avoid irritating bursa.  - Use stationary bike with seat as high as possible.    LUMBAR RADICULOPATHY AND LOW BACK PAIN:  - Discussed MRI results of hip and back with patient.  - Avoid twisting exercises that work obliques.          Large Joint Aspiration/Injection: L greater trochanteric bursa    Date/Time: 4/29/2025 3:00 PM    Performed by: Joseph Amaro PA-C  Authorized by: Joseph Amaro PA-C    Indications:  Pain  Timeout: prior to procedure the correct patient, procedure, and site was verified      Details:  Needle Size:  25 G  Location:  Hip  Site:  L greater trochanteric bursa  Medications:   2 mg LIDOcaine HCL 20 mg/ml (2%) 20 mg/mL (2 %); 12 mg betamethasone acetate-betamethasone sodium phosphate 6 mg/mL  Patient tolerance:  Patient tolerated the procedure well with no immediate complications       This note was generated with the assistance of ambient listening technology. Verbal consent was obtained by the patient and accompanying visitor(s) for the recording of patient appointment to facilitate this note. I attest to having reviewed and edited the generated note for accuracy, though some syntax or spelling errors may persist. Please contact the author of this note for any clarification.       DISCLAIMER: This note may have been dictated using voice recognition software and may contain grammatical errors.     NOTE: Consult report sent to referring provider via [employer]MR.

## 2025-06-11 DIAGNOSIS — F41.9 ANXIETY AND DEPRESSION: ICD-10-CM

## 2025-06-11 DIAGNOSIS — F32.A ANXIETY AND DEPRESSION: ICD-10-CM

## 2025-06-11 RX ORDER — ESCITALOPRAM OXALATE 10 MG/1
10 TABLET ORAL
Qty: 90 TABLET | Refills: 3 | Status: SHIPPED | OUTPATIENT
Start: 2025-06-11

## 2025-07-21 NOTE — PROGRESS NOTES
[facility] Breast Surg  Breast Surgical Oncology  New Patient Office Visit - H&P      Referring Provider: No ref. provider found  PCP: Janeen Mckay DO   Care Team:  Medical Oncologist: No care team member to display   Radiation Oncologist: No care team member to display   OBGYN: No data on file.    Chief Complaint:   Chief Complaint   Patient presents with    Breast Mass     Patient c/o constant dull and sharp right breast pain, a lump is present on the right breast  and when the pain intensifies it hurts to lift her right arm        Subjective:     HPI:  Jenny Epps is a 65 y.o. female who presents on 2025 for evaluation of a lump near the intramammary fold of the right breast. This is the same issue that she initially presented to the breast clinic for in . During last visit, took a trial of Bactrim b.i.d. times 14 days with NSAIDs and warm compresses.  The area drained about a week later and symptoms resolved. She was recommended for re-evaluation in 2 weeks but this was during COVID and patient did not return since her infection had resolved. However, she states that since she has always been able to feel a small palpable cyst in the skin. It has not bothered her again until last week when it flared up again. It is currently red, fluctuant, and very painful. Her last SCR MG was 2024 with negative results. Family history is mostly unknown, patient was adopted. She does know her mother  from liver cancer in her 70s.    Imagin2024 SCR MG - BIRADS 1: NEGATIVE    Pathology:  None    OB/GYN History:  Age at Menarche Onset: 15  Menopausal Status: postmenopausal at 56 y/o, LMP: No LMP recorded. Patient is postmenopausal.  Hysterectomy/Oophorectomy: no, neither  Hormonal birth control (duration): 33 y  Pregnancy History:   Age at first live birth: 21  Hormone Replacement Therapy: No, none    Other:  MG breast density: BIRADS B  Prior thoracic RT:  none  Genetic testing: none  Ashkenazi Christian descent: No    Family History:  Family History   Adopted: Yes   Problem Relation Name Age of Onset    Cancer Mother Venessa Cuello 70 - 79        Liver CA    Hyperlipidemia Mother Venessa Cuello     Hypertension Mother Venessa Cuello     Migraines Mother Venessa Cuello     No Known Problems Father          Patient History:  Past Medical History:   Diagnosis Date    Anxiety     Depression     Encounter for blood transfusion 1981    Fibroid 3/2024    GERD (gastroesophageal reflux disease)     History of hepatitis C     Hypertension     Menopause     Personal history of colonic polyps 2018    Thyroid disease     Toenail fungus 2024       Active Problem List with Overview Notes    Diagnosis Date Noted    Wellness examination 2025    Elevated serum creatinine 2024    Normocytic anemia 2024    Migraine without aura and without status migrainosus, not intractable 2024    GERD (gastroesophageal reflux disease) 2022    Anxiety and depression 2022    Mixed hyperlipidemia 2022    Primary hypertension 10/20/2022    Type 2 diabetes mellitus without complication, without long-term current use of insulin 10/20/2022        Past Surgical History:   Procedure Laterality Date     SECTION      COLONOSCOPY  2018    SPINE SURGERY  1998       Social History[1]    Immunization History   Administered Date(s) Administered    COVID-19, vector-nr, rS-Ad26, PF (Jamal) 2021    DTP 1968, 1968, 1968, 1969    Hepatitis B, Adult 1993, 1993, 10/05/1993    Influenza 2014, 2021    Influenza - Quadrivalent - PF *Preferred* (6 months and older) 2015, 10/17/2018, 2019, 2022, 10/28/2023    Influenza - Trivalent - Fluarix, Flulaval, Fluzone, Afluria - PF 2014, 2021, 2024    Measles 1968, 1980    OPV 1968,  "07/17/1968, 08/14/1968, 06/25/1969    Pneumococcal 05/01/2014, 05/01/2014    Rubella 12/18/1969    Td - PF (ADULT) 01/23/1980, 05/16/1991       Medications/Allergies:  Current Medications[2]     Review of patient's allergies indicates:   Allergen Reactions    Codeine Hives    Opioids - morphine analogues Hives    Tylox [oxycodone-acetaminophen] Hives     BP increases.       Review of Systems:  Pertinent items are noted in HPI.      Objective:     Vitals:  Vitals:    07/23/25 1306   BP: 129/80   BP Location: Left arm   Patient Position: Sitting   Pulse: 63   Resp: 18   Temp: 98 °F (36.7 °C)   TempSrc: Oral   SpO2: 100%   Weight: 83.5 kg (184 lb)   Height: 5' 5" (1.651 m)       Body mass index is 30.62 kg/m².     Physical Exam:  General: The patient is awake, alert and oriented times three. The patient is well nourished and in no acute distress.  Neck: There is no evidence of palpable cervical, supraclavicular or axillary adenopathy. The neck is supple. The thyroid is not enlarged.  Musculoskeletal: The patient has a normal range of motion of her bilateral upper extremities.  Chest: Examination of the chest wall fails to reveal any obvious abnormalities. Nonlabored breathing, symmetric expansion.  Breast:  Right: In the 3:00 axis near the intramammary fold, there is a fluctuant 3-4 cm erythematous lump c/w palpable concern. Examination of right breast fails to reveal any dominant masses or areas of significant focal nodularity. The nipple is everted without evidence of discharge. There is no skin dimpling with movement of the pectoralis. There are no significant skin changes overlying the breast.   Left:  Examination of the left breast fails to reveal any dominant masses or areas of significant focal nodularity. The nipple is everted without evidence of discharge. There is no skin dimpling with movement of the pectoralis. There are no significant skin changes overlying the breast.  Abdomen: The abdomen is soft, flat, " "nontender and nondistended.  Integumentary: no rashes or skin lesions present  Neurologic: cranial nerves intact, no signs of peripheral neurological deficit, motor/sensory function intact        Assessment:     Problem List[3]     Jenny was seen today for breast mass.    Diagnoses and all orders for this visit:    Abscess of breast, right  -     sulfamethoxazole-trimethoprim 800-160mg (BACTRIM DS) 800-160 mg Tab; Take 1 tablet by mouth 2 (two) times daily. for 10 days         Patient presents with an abscess of the right breast, likely secondary to a inflamed epidermal inclusion cyst.     Epidermoid cyst, also called epidermal cyst, epidermal inclusion cyst, or, improperly, "sebaceous cyst," is the most common cutaneous cyst. It can occur anywhere on the body and typically presents as an asymptomatic, skin-colored dermal nodule. The size ranges from a few millimeters to several centimeters in diameter.     Epidermoid cysts may be primary or may arise from the implantation of the follicular epithelium in the dermis, as a result of trauma, or from a comedo. Lesions may remain stable or progressively enlarge. Spontaneous inflammation and rupture can occur, with significant involvement of surrounding tissue. There is no way to predict which lesions will remain quiescent and which will become larger or inflamed. Epidermoid cysts may become secondarily infected by the normal skin brook. Infected, fluctuant cysts tend to be larger, more erythematous, and more painful than sterile, inflamed cysts, although an intense, inflammatory response to cyst rupture may also occur in the absence of infection.    Treatment of stable, uninfected epidermoid cysts is not necessary, unless desired by the patient. Inflamed, ruptured cysts that are not infected may resolve spontaneously without therapy, although they tend to recur. Injection of triamcinolone acetonide (3 mg/mL for the face and 10 mg/mL for the trunk) into the inflamed " lesion can hasten the resolution of inflammation and may prevent infection and the need for incision and drainage. If the lesion is fluctuant, incision and drainage is indicated rather than injection.    Excision is best accomplished when the lesion is not inflamed; when the cyst is acutely inflamed the cyst wall is very friable, complete excision usually is not possible, and recurrence is likely. Thus, it is reasonable to wait until the inflammation has resolved before attempting excision.       Plan:       Discussed recommend acute management of abscess with antibiotics and I&D. Will obtain C&S during procedure as well. She agreed with this plan but cannot have procedure today due to schedule conflicts with her work. She agreed to return tomorrow for I&D. In the meantime, warm compresses recommended and Tylenol/NSAIDs for pain control. Bactrim DS sent to pharmacy.    Discussed would strongly consider definitive management of cyst once it is not inflamed.     Patient due for screening mammogram. Advise resolving abscess prior to this imaging.      All of her questions were answered. She was advised to call if she develops any questions or concerns.    Marcella Miner PA-C         ----------------------------------------------------------------------------------------------------------------------------   Total time on the date of the visit ranged from 45-59 minutes (36962). Total time includes both face-to-face and non-face-to-face time personally spent by myself on the day of the visit.    Non-face-to-face time included:  _X_ preparing to see the patient such as reviewing the patient record  _X_ obtaining and reviewing separately obtained history  _X_ independently interpreting results  _X_ documenting clinical information in electronic health record.    Face-to-face time included:  _X_ performing an appropriate history and examination  _X_ communicating results to the patient  _X_ counseling and educating the  patient  _x_ ordering appropriate medications  __ ordering appropriate tests  _X_ ordering appropriate procedures (including follow-up)  _X_ answering any questions the patient had    Total Time spent on date of visit: 47 minutes          [1]   Social History  Socioeconomic History    Marital status:    Tobacco Use    Smoking status: Former     Current packs/day: 0.25     Average packs/day: 0.3 packs/day for 5.2 years (1.3 ttl pk-yrs)     Types: Cigarettes    Smokeless tobacco: Never   Substance and Sexual Activity    Alcohol use: Not Currently     Alcohol/week: 2.0 standard drinks of alcohol     Types: 2 Shots of liquor per week    Drug use: Never    Sexual activity: Not Currently     Partners: Male     Birth control/protection: Abstinence, None     Social Drivers of Health     Financial Resource Strain: Low Risk  (9/25/2024)    Overall Financial Resource Strain (CARDIA)     Difficulty of Paying Living Expenses: Not hard at all   Food Insecurity: No Food Insecurity (9/25/2024)    Hunger Vital Sign     Worried About Running Out of Food in the Last Year: Never true     Ran Out of Food in the Last Year: Never true   Physical Activity: Unknown (9/25/2024)    Exercise Vital Sign     Days of Exercise per Week: 3 days   Stress: No Stress Concern Present (9/25/2024)    [employer] - Occupational Stress Questionnaire     Feeling of Stress : Only a little   Housing Stability: Unknown (9/25/2024)    Housing Stability Vital Sign     Unable to Pay for Housing in the Last Year: No   [2]   Current Outpatient Medications:     aluminum & magnesium hydroxide-simethicone (MYLANTA MAX STRENGTH) 400-400-40 mg/5 mL suspension, Take 15 mLs by mouth every 6 (six) hours as needed., Disp: , Rfl:     diclofenac sodium (VOLTAREN) 1 % Gel, Apply 2 g topically 4 (four) times daily., Disp: 100 g, Rfl: 0    EScitalopram oxalate (LEXAPRO) 10 MG tablet, TAKE 1 TABLET BY MOUTH EVERY DAY, Disp: 90 tablet, Rfl: 3     olmesartan-hydrochlorothiazide (BENICAR HCT) 20-12.5 mg per tablet, TAKE 1 TABLET BY MOUTH EVERY DAY, Disp: 90 tablet, Rfl: 3    pantoprazole (PROTONIX) 40 MG tablet, Take 40 mg by mouth every morning., Disp: , Rfl:     rimegepant (NURTEC) 75 mg odt, Take 1 tablet (75 mg total) by mouth as needed for Migraine. Place ODT tablet on the tongue; alternatively the ODT tablet may be placed under the tongue, Disp: 16 tablet, Rfl: 5    semaglutide (OZEMPIC) 0.25 mg or 0.5 mg (2 mg/3 mL) pen injector, INJECT 0.5 MG INTO THE SKIN EVERY 7 DAYS., Disp: 3 each, Rfl: 3    sucralfate (CARAFATE) 1 gram tablet, Take 1 g by mouth as needed., Disp: , Rfl:     methylPREDNISolone (MEDROL DOSEPACK) 4 mg tablet, use as directed, Disp: 1 each, Rfl: 0    sulfamethoxazole-trimethoprim 800-160mg (BACTRIM DS) 800-160 mg Tab, Take 1 tablet by mouth 2 (two) times daily. for 10 days, Disp: 20 tablet, Rfl: 0  [3]   Patient Active Problem List  Diagnosis    Primary hypertension    Type 2 diabetes mellitus without complication, without long-term current use of insulin    GERD (gastroesophageal reflux disease)    Anxiety and depression    Mixed hyperlipidemia    Migraine without aura and without status migrainosus, not intractable    Elevated serum creatinine    Normocytic anemia    Wellness examination

## 2025-07-22 ENCOUNTER — DOCUMENTATION ONLY (OUTPATIENT)
Dept: HEMATOLOGY/ONCOLOGY | Facility: CLINIC | Age: 65
End: 2025-07-22
Payer: COMMERCIAL

## 2025-07-22 ENCOUNTER — PATIENT MESSAGE (OUTPATIENT)
Dept: HEMATOLOGY/ONCOLOGY | Facility: CLINIC | Age: 65
End: 2025-07-22
Payer: COMMERCIAL

## 2025-07-22 NOTE — Clinical Note
Hello,   Just wanted to bring to your awareness that Jenny Epps had pharmacogenomics (PGx) testing done through a  initiative [facility] is currently offering for select patients who are taking medications that may be impacted by genetic results.   As part of this , I have reviewed their genetic results and current medications to help provide insight into current medication optimization options, as well as future medication choices as appropriate.   For any future medications prescribed by any [facility] provider, if there is an interaction with the medication and the patient's genetic results, a clinical alert will fire for both providers and pharmacists. These interactions are reviewed by the PGx team monthly and updated as new data becomes available.   If you have any questions about the results or PGx at [facility] in general, please don't hesitate to reach out to me directly.   Misa Chavez, PharmD  Clinical Pharmacogenomics Pharmacist

## 2025-07-22 NOTE — PROGRESS NOTES
"Pharmacogenomics (PGx) Consult     Chief Complaint: Jenny Epps has undergone pharmacogenomic testing via self-enrollment in the [employer] PGx . Patient completed sample collection on 5/9/2025 and results were provided on 6/15/2025.     Test results: Pharmacogenomic results can be found in GENOMIC INDICATORS    Past Medical History:  -------------------------------------    Anxiety    Depression    GERD (gastroesophageal reflux disease)    History of hepatitis C    Hypertension    Personal history of colonic polyps    Toenail fungus        Allergies:   Review of patient's allergies indicates:   Allergen Reactions    Codeine Hives    Opioids - morphine analogues Hives    Tylox [oxycodone-acetaminophen] Hives     BP increases.        Medications: Current Medications[1]      Clinical Recommendations based on PGx    -Patient is a poor metabolizer of pantoprazole which may increase their risk of developing side effects due to likely increased plasma concentrations. As patient has been taking 40mg daily for >12 weeks, could consider reducing the dose of pantoprazole to 20mg daily or switching to esomperazole/rabeprazole as these are not impacted by the poor metabolizer result.    -Patient is also a poor metabolizer of escitalopram which can increase the risk of side effect due to likely increased concentrations. Per chart review, patient has been taking escitalopram for several years with If patient is currently tolerating/finds benefit with this medication at current dose, would NOT recommend making changes based solely on pharmacogenomic results.     -None of the patient's other current medications are likely impacted by their pharmacogenomic results     Future Medication Considerations based on PGx    -Patient has 5 PGx results that can impact future medication selection- please see "Clinically Actionable PGx Results" for full list of medication interactions    -For future antidepressant prescribing " "recommendations, use the "Antidepressants with PGx" Smart Set under Orders to view personalized antidepressant recommendations based on the patients Pgx.    -If there is an interaction with any future medications and the patient's genetic results, a clinical alert will fire for both providers and pharmacists. These interactions are reviewed by the PGx team and updated as new data becomes available.      -PGx information can be accessed by patients within [employer]- results with "actionable result DETECTED" should be communicated to providers outside of [facility] as our clinical alerts will NOT fire    Clinically Actionable PGx Results   *Note, actionable recommendations may change based on updates to existing PGx literature. For the most up to date medication recommendations based on patients' results, please view the GENOMIC INDICATORS module in Epic.    Genomic Indicators        CYP2B6 Intermediate Metabolizer Updated 6/15/2025 by Security, Standard Interface User      Genomic results predict that this patient may metabolize CYP2B6 substrates at a rate that falls below average metabolic capacity. Thus, this patient may have an increased risk of adverse or poor response to medications metabolized by CYP2B6. To avoid these types of responses, dose adjustments or alternative therapeutic agents may be necessary when medications metabolized by CYP2B6 are being considered. For questions, call [phone] ([phone]) or order PGx Consult [[id number]].         Intermediate Metabolizer of Efavirenz          Genetic results for this patient indicate an increased risk of CNS adverse events with a standard dose of efavirenz based on CYP2B6 genotype. Consider reducing the starting dose to 400 mg/day.    See [facility]IC clinical guidelines for more information.              Reference Links    [facility]IC® Guideline for Efavirenz based on CYP2B6 genotype    [facility]IC® Guideline for Serotonin Reuptake Inhibitor Antidepressants and CYP2D6, ISV7Q51, CYP2B6, " SLC6A4, and HTR2A                      PVG3F45 Poor Metabolizer Updated 6/15/2025 by Security, Standard Interface User      Genomic results predict that this patient may metabolize SMM9S16 substrates at a rate that falls greatly below average metabolic capacity or approaches zero. Thus, this patient may have an increased risk of adverse or poor response to medications metabolized by FYB8P69. To avoid these types of responses, dose adjustments or alternative therapeutic agents may be necessary when medications metabolized by BVP8S34 are being considered. For questions, call [phone] ([phone]) or order PGx Consult [[id number]].         Poor Metabolizer of Clopidogrel        Genetic results for this patient indicate increased risk of treatment failure with clopidogrel (PLAVIX), based on INS0X27 genotype. Consider an alternative drug such as prasugrel or ticagrelor.     See [facility]IC clinical guidelines for more information.               Poor Metabolizer of Voriconazole        This patient has a JZF8I37 genomic indicator which indicates reduced metabolism of voriconazole and increased probability of adverse events when treated with the drug. Consider an alternative drug not dependent on LUR9M90 metabolism, such as isavuconazole, liposomal amphotericin B, or posaconazole.     See [facility]IC clinical guidelines for more information.                  Poor Metabolizer of Amitriptyline        This patient has UGW8G87 and/or CYP2D6 genomic indicators which indicate altered metabolism of tricyclic antidepressants such as amitriptyline and potential for lack of efficacy. Consider an alternative drug not metabolized by SWZ1G82/CYP2D6.     See [facility] clinical guidelines for more information.                Poor Metabolizer of Citalopram        This patient has a KHU3C38 genomic indicator which indicates reduced metabolism of citalopram/escitalopram and increased probability of side effects. Consider an alternative drug not predominantly  metabolized by KMO6D79.     See [facility]IC clinical guidelines for more information.                Poor Metabolizer of Dexlansoprazole        This patient has a LIU7V64 genomic indicator which indicates reduced metabolism of PPIs and increased risk of side effects. For chronic therapy (>12 weeks) consider 50% reduction of dose.     See [facility]IC clinical guidelines for more information.                Poor Metabolizer of Escitalopram        This patient has a AZE2U26 genomic indicator which indicates reduced metabolism of citalopram/escitalopram and increased probability of side effects. Consider an alternative drug not predominantly metabolized by ESQ7O93.     See [facility] clinical guidelines for more information.                Poor Metabolizer of Lansoprazole        This patient has a DIQ5X53 genomic indicator which indicates reduced metabolism of PPIs and increased risk of side effects. For chronic therapy (>12 weeks) consider 50% reduction of dose.     See [facility]IC clinical guidelines for more information.                Poor Metabolizer of Omeprazole        This patient has a JMN5A36 genomic indicator which indicates reduced metabolism of PPIs and increased risk of side effects. For chronic therapy (>12 weeks) consider 50% reduction of dose.     See [facility] clinical guidelines for more information.                Poor Metabolizer of Pantoprazole        This patient has a DBU5W56 genomic indicator which indicates reduced metabolism of PPIs and increased risk of side effects. For chronic therapy (>12 weeks) consider 50% reduction of dose.     See [facility] clinical guidelines for more information.                Poor Metabolizer of Sertraline        This patient has EUG7A33 and/or CYP2B6 genomic indicators which indicate altered metabolism of sertraline.     Consider therapeutic recommendations from [facility]IC clinical guidelines.               Reference Links    [facility]IC® Guideline for Clopidogrel and VYH8X93    CPIC® Guideline for Proton Pump  Inhibitors and FIT6H39    CPIC® Guideline for Voriconazole and WCO0R39    CPIC® Guideline for Serotonin Reuptake Inhibitor Antidepressants and CYP2D6, UZB1D26, CYP2B6, SLC6A4, and HTR2A    CPIC® Guideline for Tricyclic Antidepressants and CYP2D6 and IIN0M09                      CYP2D6 Intermediate Metabolizer Updated 6/15/2025 by Security, Standard Interface User      Genomic results predict that this patient may metabolize CYP2D6 substrates at a rate that falls below average metabolic capacity. Thus, this patient may have an increased risk of adverse or poor response to medications metabolized by CYP2D6. To avoid these types of responses, dose adjustments or alternative therapeutic agents may be necessary when medications metabolized by CYP2D6 are being considered. For questions, call [phone] ([phone]) or order PGx Consult [[id number]].         Intermediate Metabolizer of Amitriptyline        This patient has a CYP2D6 genomic indicator which indicates reduced metabolism of tricyclic antidepressants such as amitriptyline and potential for side effects. Consider an alternative drug or reduction in recommended starting dose.     See [facility]IC clinical guidelines for more information.               Intermediate Metabolizer of Atomoxetine        This patient has a CYP2D6 genomic indicator which indicates reduced metabolism of atomoxetine. Initiate with usual starting dose and wait 2 weeks for clinical response/adverse effects before changing dose.     See [facility]IC clinical guidelines for more information.                Intermediate Metabolizer of Codeine        This patient has a CYP2D6 genomic indicator which indicates reduced metabolism of codeine and potential for insufficient pain relief when treated with codeine. Use normal doses of codeine and if no response, consider an alternative non-codeine opioid.     See [facility]IC clinical guidelines for more information.               Intermediate Metabolizer of Nortriptyline         This patient has a CYP2D6 genomic indicator which indicates reduced metabolism of tricyclic antidepressants (TCAs) such as nortriptyline and increased probability of side effects. Consider an alternative drug or reduction in starting dose.     See [facility]IC clinical guidelines for more information.               Intermediate Metabolizer of Tamoxifen        This patient has a CYP2D6 genomic indicator which indicates lower endoxifen concentrations and higher risk of breast cancer recurrence. Consider an alternative hormonal therapy.     See [facility]IC clinical guidelines for more information.               Intermediate Metabolizer of Tramadol        This patient has a CYP2D6 genomic indicator which indicates reduced metabolism of tramadol and potential for insufficient pain relief when treated with tramadol. Use normal doses of tramadol and if no response, consider an alternative non-codeine opioid.     See [facility]IC clinical guidelines for more information.              Reference Links    [facility]IC® Guideline for Ondansetron and Tropisetron based on CYP2D6 genotype    CPIC® Guideline for Atomoxetine based on CYP2D6 genotype    [facility]IC® Guideline for Serotonin Reuptake Inhibitor Antidepressants and CYP2D6, OFB0C65, CYP2B6, SLC6A4, and HTR2A    CPIC® Guideline for Tricyclic Antidepressants and CYP2D6 and FTL4F75    CPIC® Guideline for Opioids and CYP2D6, OPRM1, and COMT    CPIC® Guideline for Tamoxifen based on CYP2D6 genotype                      CY Intermediate Metabolizer Updated 6/15/2025 by Security, Standard Interface User      Genomic results predict that this patient may metabolize CY substrates at a rate that falls below metabolic capacity. For some populations, absence of functional CY is the normal state. Patients with this genotype may require higher starting doses of certain medications metabolized by CY, including tacrolimus. For questions, call [phone] ([phone]) or order PGx Consult [[id number]].         Intermediate Metabolizer of Tacrolimus        This patient has a CY genomic indicator which indicates decreased chance of achieving target tacrolimus concentrations. Increase starting dose to 1.5 to 2 times the standard dose.     See [facility]IC clinical guidelines for more information.              Reference Links    CPIC® Guideline for Tacrolimus and CY                      TPMT Intermediate Metabolizer Updated 6/15/2025 by Security, Standard Interface User      Genomic results predict that this patient may metabolize TPMT substrates at a rate that falls below average metabolic capacity. Thus, this patient may have an increased risk of adverse response to medications metabolized via TPMT-dependent pathways. To avoid this type of response, dose adjustments are recommended when medications affected by TPMT are being considered.  Specific increased risks include drug-related leukopenia, neutropenia, and myelosuppression. For questions, call [phone] ([phone]) or order PGx Consult [[id number]].         Intermediate Metabolizer of Azathioprine        Based on genetic testing for TPMT or NUDT15, this patient is at INCREASED risk for myelotoxicity with standard dosing of thiopurine therapy. Reduce starting dose by 30-80%.      See [facility]IC Clinical Guidelines for more information.               Intermediate Metabolizer of Mercaptopurine        Based on genetic testing for TPMT or NUDT15, this patient is at INCREASED risk for myelotoxicity with standard dosing of thiopurine therapy. Reduce starting dose by 30-80%.      See [facility]IC Clinical Guidelines for more information.               Intermediate Metabolizer of Thioguanine        This patient has TPMT and/or NUDT15 genomic indicators which indicate increased risk of thiopurine-related leukopenia, neutropenia, and myelosuppression. Depending on metabolizer status, fatal toxicity is possible without dose decrease. Use an alternative or start with reduced doses (50-80% of normal  dose).     See [facility]IC clinical guidelines for detailed guidelines.              Reference Links    [facility]IC® Guideline for Thiopurines and TPMT and NUDT15                    -For any additional questions, please don't hesitate to reach out to me or contact [phone] (GENE).    Misa Valle, PharmD   Clinical Pharmacogenomics Pharmacist               [1]   aluminum & magnesium hydroxide-simethicone (MYLANTA MAX STRENGTH) 400-400-40 mg/5 mL suspension    diclofenac sodium (VOLTAREN) 1 % Gel    EScitalopram oxalate (LEXAPRO) 10 MG tablet    methylPREDNISolone (MEDROL DOSEPACK) 4 mg tablet    olmesartan-hydrochlorothiazide (BENICAR HCT) 20-12.5 mg per tablet    pantoprazole (PROTONIX) 40 MG tablet    rimegepant (NURTEC) 75 mg odt    semaglutide (OZEMPIC) 0.25 mg or 0.5 mg (2 mg/3 mL) pen injector    sucralfate (CARAFATE) 1 gram tablet

## 2025-07-23 ENCOUNTER — OFFICE VISIT (OUTPATIENT)
Dept: SURGERY | Facility: CLINIC | Age: 65
End: 2025-07-23
Payer: COMMERCIAL

## 2025-07-23 VITALS
SYSTOLIC BLOOD PRESSURE: 129 MMHG | TEMPERATURE: 98 F | WEIGHT: 184 LBS | HEIGHT: 65 IN | HEART RATE: 63 BPM | RESPIRATION RATE: 18 BRPM | DIASTOLIC BLOOD PRESSURE: 80 MMHG | BODY MASS INDEX: 30.66 KG/M2 | OXYGEN SATURATION: 100 %

## 2025-07-23 DIAGNOSIS — N61.1 ABSCESS OF BREAST, RIGHT: Primary | ICD-10-CM

## 2025-07-23 PROCEDURE — 3044F HG A1C LEVEL LT 7.0%: CPT | Mod: CPTII,S$GLB,, | Performed by: PHYSICIAN ASSISTANT

## 2025-07-23 PROCEDURE — 1160F RVW MEDS BY RX/DR IN RCRD: CPT | Mod: CPTII,S$GLB,, | Performed by: PHYSICIAN ASSISTANT

## 2025-07-23 PROCEDURE — 3008F BODY MASS INDEX DOCD: CPT | Mod: CPTII,S$GLB,, | Performed by: PHYSICIAN ASSISTANT

## 2025-07-23 PROCEDURE — 99999 PR PBB SHADOW E&M-EST. PATIENT-LVL IV: CPT | Mod: PBBFAC,,, | Performed by: PHYSICIAN ASSISTANT

## 2025-07-23 PROCEDURE — 1159F MED LIST DOCD IN RCRD: CPT | Mod: CPTII,S$GLB,, | Performed by: PHYSICIAN ASSISTANT

## 2025-07-23 PROCEDURE — 99204 OFFICE O/P NEW MOD 45 MIN: CPT | Mod: S$GLB,,, | Performed by: PHYSICIAN ASSISTANT

## 2025-07-23 PROCEDURE — 3074F SYST BP LT 130 MM HG: CPT | Mod: CPTII,S$GLB,, | Performed by: PHYSICIAN ASSISTANT

## 2025-07-23 PROCEDURE — 3079F DIAST BP 80-89 MM HG: CPT | Mod: CPTII,S$GLB,, | Performed by: PHYSICIAN ASSISTANT

## 2025-07-23 RX ORDER — SULFAMETHOXAZOLE AND TRIMETHOPRIM 800; 160 MG/1; MG/1
1 TABLET ORAL 2 TIMES DAILY
Qty: 20 TABLET | Refills: 0 | Status: SHIPPED | OUTPATIENT
Start: 2025-07-23 | End: 2025-08-02

## 2025-07-24 ENCOUNTER — PROCEDURE VISIT (OUTPATIENT)
Dept: SURGERY | Facility: CLINIC | Age: 65
End: 2025-07-24
Payer: COMMERCIAL

## 2025-07-24 ENCOUNTER — DOCUMENTATION ONLY (OUTPATIENT)
Dept: SURGERY | Facility: CLINIC | Age: 65
End: 2025-07-24

## 2025-07-24 VITALS
WEIGHT: 185.38 LBS | OXYGEN SATURATION: 99 % | TEMPERATURE: 98 F | SYSTOLIC BLOOD PRESSURE: 122 MMHG | BODY MASS INDEX: 30.89 KG/M2 | DIASTOLIC BLOOD PRESSURE: 76 MMHG | HEART RATE: 58 BPM | HEIGHT: 65 IN | RESPIRATION RATE: 16 BRPM

## 2025-07-24 DIAGNOSIS — N61.1 ABSCESS OF BREAST, RIGHT: Primary | ICD-10-CM

## 2025-07-24 NOTE — PROGRESS NOTES
Jenny Epps is a 65 y.o.female breast infection and abscess who was seen in clinic yesterday.  She preferred to have incision and drainage done today since she was at work yesterday.  She has not started her course of Bactrim yet.  Risks and benefits of incision and drainage of right breast abscess were discussed with her and consent was signed.      PROCEDURE: Incision and Drainage  Location: R breast LIQ 4-5:00, near sternum.        Time-out was performed confirming patient site and allergies.  Sterile technique was used. The skin on and around the abscess was prepped with ChloraPrep.  Lidocaine was infiltrated around the area of the abscess. An 11-blade was used to make a 10mm incision into the fluctuant mass.  A very small amount of purulence was expressed.  The contents were very thick.  The patient was having a very hard time tolerating the procedure and the infiltration of lidocaine due to pain. We stopped before the abscess was decompressed.  A dry gauze dressing was placed over the site in the skin around the abscess was packed with the ice.      We discussed that I can take her to the operating room to drain the rest of the abscess.  She would like to try and get the abscess to express on its own with some warm compresses at home.  We will touch base with her later today to see how she is feeling.  Recommend starting a course of Bactrim today.  All questions    Sarah Mahajan MD  Breast Surgical Oncology

## 2025-07-24 NOTE — PROGRESS NOTES
"Pt called to inform us on how she was feeling from today's procedure.  She stated that she was feeling "good" right now, was not having any pain, and she was applying hot compresses.    "

## 2025-07-28 ENCOUNTER — OFFICE VISIT (OUTPATIENT)
Dept: SURGERY | Facility: CLINIC | Age: 65
End: 2025-07-28
Payer: COMMERCIAL

## 2025-07-28 VITALS
HEART RATE: 64 BPM | OXYGEN SATURATION: 98 % | WEIGHT: 184.38 LBS | BODY MASS INDEX: 29.63 KG/M2 | SYSTOLIC BLOOD PRESSURE: 106 MMHG | DIASTOLIC BLOOD PRESSURE: 72 MMHG | RESPIRATION RATE: 18 BRPM | TEMPERATURE: 98 F | HEIGHT: 66 IN

## 2025-07-28 DIAGNOSIS — N61.1 ABSCESS OF BREAST, RIGHT: Primary | ICD-10-CM

## 2025-07-28 PROCEDURE — 99999 PR PBB SHADOW E&M-EST. PATIENT-LVL III: CPT | Mod: PBBFAC,,, | Performed by: STUDENT IN AN ORGANIZED HEALTH CARE EDUCATION/TRAINING PROGRAM

## 2025-07-28 NOTE — PROGRESS NOTES
[facility] Breast Surg  Breast Surgical Oncology  Est Patient Office Visit - H&P      Care Team: Patient Care Team:  Janeen Mckay DO as PCP - General  Gregory Zazueta MD as Consulting Physician (Gastroenterology)  Delbert Giron MD as Consulting Physician (Cardiology)  Cee Lombardi MD as Consulting Physician (Obstetrics and Gynecology)  Marcella Miner PA as Physician Assistant (General Surgery)  Sarah Mahajan MD as Consulting Physician (Breast Surgery)   Referring Provider: No ref. provider found    Chief Complaint:   Chief Complaint   Patient presents with    Follow-up     Patient is here a wound check           Subjective:        Interval History:  07/28/2025 - Jenny Epps returns today for follow up of right breast abscess.  She underwent an I and D last Thursday and was unable to tolerate the full procedure.  However, an incision was made in over the weekend she did put some warm compresses in the area and was able to express out a lot of pus.  She states the area is feeling much better today.  She is on her Bactrim.      HPI:  Jenny Epps is a 65 y.o. female who presents on 7/23/2025 for evaluation of a lump near the intramammary fold of the right breast. This is the same issue that she initially presented to the breast clinic for in 2020. During last visit, took a trial of Bactrim b.i.d. times 14 days with NSAIDs and warm compresses.  The area drained about a week later and symptoms resolved. She was recommended for re-evaluation in 2 weeks but this was during COVID and patient did not return since her infection had resolved. However, she states that since she has always been able to feel a small palpable cyst in the skin. It has not bothered her again until last week when it flared up again. It is currently red, fluctuant, and very painful. Her last SCR MG was June 2024 with negative results. Family history is mostly unknown, patient was adopted. She  does know her mother  from liver cancer in her 70s.    Imagin2024 SCR MG - BIRADS 1: NEGATIVE     I have reviewed the imaging and agree with the radiologists interpretation. I have discussed these results with the patient.    Pathology:   None    OB / GYN History   Age at Menarche Onset: 15  Menopausal Status: postmenopausal at 56 y/o, LMP: No LMP recorded. Patient is postmenopausal.  Hysterectomy/Oophorectomy: no, neither  Hormonal birth control (duration): 33 y  Pregnancy History:   Age at first live birth: 21  Hormone Replacement Therapy: No, none    Family History of Cancer (& age at diagnosis):  -   Family History   Adopted: Yes   Problem Relation Name Age of Onset    Cancer Mother Venessa Cuello 70 - 79        Liver CA    Hyperlipidemia Mother Venessa Cuello     Hypertension Mother Venessa Cuello     Migraines Mother Venessa Cuello     No Known Problems Father          Lifestyle:  Height and Weight:   BMI: Body mass index is 29.76 kg/m².     Other History:     Past Medical History:   Diagnosis Date    Anxiety     Depression     Encounter for blood transfusion 1981    Fibroid 3/2024    GERD (gastroesophageal reflux disease)     History of hepatitis C     Hypertension     Menopause     Personal history of colonic polyps 2018    Thyroid disease     Toenail fungus 2024        Past Surgical History:   Procedure Laterality Date     SECTION      COLONOSCOPY  2018    SPINE SURGERY  1998        Social History[1]     Immunization History   Administered Date(s) Administered    COVID-19, vector-nr, rS-Ad26, PF (Jamal) 2021    DTP 1968, 1968, 1968, 1969    Hepatitis B, Adult 1993, 1993, 10/05/1993    Influenza 2014, 2021    Influenza - Quadrivalent - PF *Preferred* (6 months and older) 2015, 10/17/2018, 2019, 2022, 10/28/2023    Influenza - Trivalent - Fluarix, Flulaval,  "Fluzone, Afluria - PF 11/07/2014, 12/16/2021, 12/02/2024    Measles 07/17/1968, 01/23/1980    OPV 06/04/1968, 07/17/1968, 08/14/1968, 06/25/1969    Pneumococcal 05/01/2014, 05/01/2014    Rubella 12/18/1969    Td - PF (ADULT) 01/23/1980, 05/16/1991        Medications/Allergies:       Current Outpatient Medications   Medication Instructions    aluminum & magnesium hydroxide-simethicone (MYLANTA MAX STRENGTH) 400-400-40 mg/5 mL suspension 15 mLs, Every 6 hours PRN    diclofenac sodium (VOLTAREN) 2 g, Topical (Top), 4 times daily    EScitalopram oxalate (LEXAPRO) 10 mg, Oral    NURTEC 75 mg, Oral, As needed (PRN), Place ODT tablet on the tongue; alternatively the ODT tablet may be placed under the tongue    olmesartan-hydrochlorothiazide (BENICAR HCT) 20-12.5 mg per tablet 1 tablet, Oral    OZEMPIC 0.5 mg, Subcutaneous, Every 7 days    pantoprazole (PROTONIX) 40 mg, Every morning    sucralfate (CARAFATE) 1 g, As needed (PRN)    sulfamethoxazole-trimethoprim 800-160mg (BACTRIM DS) 800-160 mg Tab 1 tablet, Oral, 2 times daily       Review of patient's allergies indicates:   Allergen Reactions    Codeine Hives    Opioids - morphine analogues Hives    Tylox [oxycodone-acetaminophen] Hives     BP increases.        Review of Systems:      ROS  See HPI for pertinent ROS.     Objective/Physical Exam   Visit Vitals  /72 (BP Location: Left arm, Patient Position: Sitting)   Pulse 64   Temp 98.4 °F (36.9 °C) (Oral)   Resp 18   Ht 5' 6" (1.676 m)   Wt 83.6 kg (184 lb 6.4 oz)   SpO2 98%   BMI 29.76 kg/m²        Physical Exam    Right breast:  abscess in the lower inner quadrant near the sternum.  Decreased erythema.  Decreased swelling.  Still with small amount of fluctuance.     Assessment and Plan     1. Abscess of breast, right      Jenny Epps is a 65 y.o.female who presents for follow up of right breast abscess.  The abscess is improving.  The incision is open and still with a small amount of purulent drainage on exam " today.  We were able to decompress at a little bit more today.  Recommend continued warm compress a couple of times today and b.i.d. dressing changes.  Recommend continue course of Bactrim.  We will see her back next week for wound check.  All questions answered.    Sarah Mahajan MD  Breast Surgical Oncology       I spent a total of 20 minutes on the day of the visit.  This includes face to face time and non-face to face time preparing to see the patient (eg, review of tests), obtaining and/or reviewing separately obtained history, documenting clinical information in the electronic or other health record, independently interpreting results and communicating results to the patient/family/caregiver, or care coordinator.         [1]   Social History  Socioeconomic History    Marital status:    Tobacco Use    Smoking status: Former     Current packs/day: 0.25     Average packs/day: 0.3 packs/day for 5.2 years (1.3 ttl pk-yrs)     Types: Cigarettes    Smokeless tobacco: Never   Substance and Sexual Activity    Alcohol use: Not Currently     Alcohol/week: 2.0 standard drinks of alcohol     Types: 2 Shots of liquor per week    Drug use: Never    Sexual activity: Not Currently     Partners: Male     Birth control/protection: Abstinence, None     Social Drivers of Health     Financial Resource Strain: Low Risk  (9/25/2024)    Overall Financial Resource Strain (CARDIA)     Difficulty of Paying Living Expenses: Not hard at all   Food Insecurity: No Food Insecurity (9/25/2024)    Hunger Vital Sign     Worried About Running Out of Food in the Last Year: Never true     Ran Out of Food in the Last Year: Never true   Physical Activity: Unknown (9/25/2024)    Exercise Vital Sign     Days of Exercise per Week: 3 days   Stress: No Stress Concern Present (9/25/2024)    [employer] [facility] - Occupational Stress Questionnaire     Feeling of Stress : Only a little   Housing Stability: Unknown (9/25/2024)     Housing Stability Vital Sign     Unable to Pay for Housing in the Last Year: No

## 2025-08-07 ENCOUNTER — OFFICE VISIT (OUTPATIENT)
Dept: SURGERY | Facility: CLINIC | Age: 65
End: 2025-08-07
Payer: COMMERCIAL

## 2025-08-07 VITALS
HEART RATE: 61 BPM | OXYGEN SATURATION: 98 % | BODY MASS INDEX: 29.25 KG/M2 | WEIGHT: 182 LBS | DIASTOLIC BLOOD PRESSURE: 74 MMHG | HEIGHT: 66 IN | SYSTOLIC BLOOD PRESSURE: 112 MMHG | TEMPERATURE: 98 F | RESPIRATION RATE: 18 BRPM

## 2025-08-07 DIAGNOSIS — N61.1 ABSCESS OF BREAST, RIGHT: Primary | ICD-10-CM

## 2025-08-07 PROCEDURE — 3074F SYST BP LT 130 MM HG: CPT | Mod: CPTII,S$GLB,, | Performed by: STUDENT IN AN ORGANIZED HEALTH CARE EDUCATION/TRAINING PROGRAM

## 2025-08-07 PROCEDURE — 3008F BODY MASS INDEX DOCD: CPT | Mod: CPTII,S$GLB,, | Performed by: STUDENT IN AN ORGANIZED HEALTH CARE EDUCATION/TRAINING PROGRAM

## 2025-08-07 PROCEDURE — 3078F DIAST BP <80 MM HG: CPT | Mod: CPTII,S$GLB,, | Performed by: STUDENT IN AN ORGANIZED HEALTH CARE EDUCATION/TRAINING PROGRAM

## 2025-08-07 PROCEDURE — 3044F HG A1C LEVEL LT 7.0%: CPT | Mod: CPTII,S$GLB,, | Performed by: STUDENT IN AN ORGANIZED HEALTH CARE EDUCATION/TRAINING PROGRAM

## 2025-08-07 PROCEDURE — 99999 PR PBB SHADOW E&M-EST. PATIENT-LVL III: CPT | Mod: PBBFAC,,, | Performed by: STUDENT IN AN ORGANIZED HEALTH CARE EDUCATION/TRAINING PROGRAM

## 2025-08-07 PROCEDURE — 1160F RVW MEDS BY RX/DR IN RCRD: CPT | Mod: CPTII,S$GLB,, | Performed by: STUDENT IN AN ORGANIZED HEALTH CARE EDUCATION/TRAINING PROGRAM

## 2025-08-07 PROCEDURE — 99213 OFFICE O/P EST LOW 20 MIN: CPT | Mod: S$GLB,,, | Performed by: STUDENT IN AN ORGANIZED HEALTH CARE EDUCATION/TRAINING PROGRAM

## 2025-08-07 PROCEDURE — 1159F MED LIST DOCD IN RCRD: CPT | Mod: CPTII,S$GLB,, | Performed by: STUDENT IN AN ORGANIZED HEALTH CARE EDUCATION/TRAINING PROGRAM

## 2025-08-07 NOTE — PROGRESS NOTES
[facility] Breast Surg  Breast Surgical Oncology  Est Patient Office Visit - H&P      Care Team: Patient Care Team:  Janeen Mckay DO as PCP - General  Gregory Zazueta MD as Consulting Physician (Gastroenterology)  Delbert Giron MD as Consulting Physician (Cardiology)  Cee Lombardi MD as Consulting Physician (Obstetrics and Gynecology)  Marcella Miner PA as Physician Assistant (General Surgery)  Sarah Mahajan MD as Consulting Physician (Breast Surgery)   Referring Provider: No ref. provider found    Chief Complaint:   Chief Complaint   Patient presents with    Follow-up     Patient reports no breast related concerns           Subjective:        Interval History:  2025 - Jenny Epps returns today for follow up of right breast abscess. No residual pain. Small amt of drainage yesterday. Has one more day of abx.     HPI:  Jenny Epps is a 65 y.o. female who presents on 2025 for evaluation of a lump near the intramammary fold of the right breast. This is the same issue that she initially presented to the breast clinic for in . During last visit, took a trial of Bactrim b.i.d. times 14 days with NSAIDs and warm compresses.  The area drained about a week later and symptoms resolved. She was recommended for re-evaluation in 2 weeks but this was during COVID and patient did not return since her infection had resolved. However, she states that since she has always been able to feel a small palpable cyst in the skin. It has not bothered her again until last week when it flared up again. It is currently red, fluctuant, and very painful. Her last SCR MG was 2024 with negative results.   Family history is mostly unknown, patient was adopted. She does know her mother  from liver cancer in her 70s.    Imagin2024 SCR MG - BIRADS 1: NEGATIVE     I have reviewed the imaging and agree with the radiologists interpretation. I have discussed these  results with the patient.    Pathology:   None    OB / GYN History   Age at Menarche Onset: 15  Menopausal Status: postmenopausal at 56 y/o, LMP: No LMP recorded. Patient is postmenopausal.  Hysterectomy/Oophorectomy: no, neither  Hormonal birth control (duration): 33 y  Pregnancy History:   Age at first live birth: 21  Hormone Replacement Therapy: No, none    Family History of Cancer (& age at diagnosis):  -   Family History   Adopted: Yes   Problem Relation Name Age of Onset    Cancer Mother Venessa Cuello 70 - 79        Liver CA    Hyperlipidemia Mother Venessa Cuello     Hypertension Mother Venessa Cuello     Migraines Mother Venessa Cuello     No Known Problems Father          Lifestyle:  Height and Weight:   BMI: Body mass index is 29.38 kg/m².     Other History:     Past Medical History:   Diagnosis Date    Anxiety     Depression     Encounter for blood transfusion 1981    Fibroid 3/2024    GERD (gastroesophageal reflux disease)     History of hepatitis C     Hypertension     Menopause     Personal history of colonic polyps 2018    Pregnancy     Thyroid disease     Toenail fungus 2024        Past Surgical History:   Procedure Laterality Date     SECTION      COLONOSCOPY  2018    COLPOSCOPY      SPINE SURGERY  1998        Social History[1]     Immunization History   Administered Date(s) Administered    COVID-19, vector-nr, rS-Ad26, PF (Jamal) 2021    DTP 1968, 1968, 1968, 1969    Hepatitis B, Adult 1993, 1993, 10/05/1993    Influenza 2014, 2021    Influenza - Quadrivalent - PF *Preferred* (6 months and older) 2015, 10/17/2018, 2019, 2022, 10/28/2023    Influenza - Trivalent - Fluarix, Flulaval, Fluzone, Afluria - PF 2014, 2021, 2024    Measles 1968, 1980    OPV 1968, 1968, 1968, 1969    Pneumococcal 2014, 2014  "   Rubella 12/18/1969    Td - PF (ADULT) 01/23/1980, 05/16/1991        Medications/Allergies:       Current Outpatient Medications   Medication Instructions    aluminum & magnesium hydroxide-simethicone (MYLANTA MAX STRENGTH) 400-400-40 mg/5 mL suspension 15 mLs, Every 6 hours PRN    diclofenac sodium (VOLTAREN) 2 g, Topical (Top), 4 times daily    EScitalopram oxalate (LEXAPRO) 10 mg, Oral    NURTEC 75 mg, Oral, As needed (PRN), Place ODT tablet on the tongue; alternatively the ODT tablet may be placed under the tongue    olmesartan-hydrochlorothiazide (BENICAR HCT) 20-12.5 mg per tablet 1 tablet, Oral    OZEMPIC 0.5 mg, Subcutaneous, Every 7 days    pantoprazole (PROTONIX) 40 mg, Every morning    sucralfate (CARAFATE) 1 g, As needed (PRN)       Review of patient's allergies indicates:   Allergen Reactions    Codeine Hives    Opioids - morphine analogues Hives    Tylox [oxycodone-acetaminophen] Hives     BP increases.        Review of Systems:      ROS  See HPI for pertinent ROS.     Objective/Physical Exam   Visit Vitals  /74 (BP Location: Left arm, Patient Position: Sitting)   Pulse 61   Temp 97.9 °F (36.6 °C) (Oral)   Resp 18   Ht 5' 6" (1.676 m)   Wt 82.6 kg (182 lb)   SpO2 98%   BMI 29.38 kg/m²          Physical Exam    Right breast: 1cm incision in the LIQ near the sternum with hypergranulation tissue coming out. Small amount of residual induration inferior to the incision about 2x1cm. No tenderness. No fluctuance.      Assessment and Plan     1. Abscess of breast, right    Jenny Epps is a 65 y.o.female who presents for follow up of right breast abscess.  Healing well. Silver nitrate applied to the hypergranulation tissue today. Complete course of abx. Dressing as needed until fully healed.      She is due for BL mammogram and since she is having this recent abscess will order BL diagnostic w R breast US.  Will see her back in clinic after imaging. All questions answered.         Sarah Mahajan " MD  Breast Surgical Oncology       I spent a total of 20 minutes on the day of the visit.  This includes face to face time and non-face to face time preparing to see the patient (eg, review of tests), obtaining and/or reviewing separately obtained history, documenting clinical information in the electronic or other health record, independently interpreting results and communicating results to the patient/family/caregiver, or care coordinator.         [1]   Social History  Socioeconomic History    Marital status:    Tobacco Use    Smoking status: Former     Current packs/day: 0.25     Average packs/day: 0.3 packs/day for 6.1 years (1.6 ttl pk-yrs)     Types: Cigarettes    Smokeless tobacco: Never   Substance and Sexual Activity    Alcohol use: Not Currently     Alcohol/week: 2.0 standard drinks of alcohol     Types: 2 Shots of liquor per week    Drug use: Never    Sexual activity: Not Currently     Partners: Male     Birth control/protection: Abstinence, None     Social Drivers of Health     Financial Resource Strain: Low Risk  (9/25/2024)    Overall Financial Resource Strain (CARDIA)     Difficulty of Paying Living Expenses: Not hard at all   Food Insecurity: No Food Insecurity (9/25/2024)    Hunger Vital Sign     Worried About Running Out of Food in the Last Year: Never true     Ran Out of Food in the Last Year: Never true   Physical Activity: Unknown (9/25/2024)    Exercise Vital Sign     Days of Exercise per Week: 3 days   Stress: No Stress Concern Present (9/25/2024)    [employer] [place] [employer] - Occupational Stress Questionnaire     Feeling of Stress : Only a little   Housing Stability: Unknown (9/25/2024)    Housing Stability Vital Sign     Unable to Pay for Housing in the Last Year: No

## 2025-08-08 ENCOUNTER — TELEPHONE (OUTPATIENT)
Facility: CLINIC | Age: 65
End: 2025-08-08
Payer: COMMERCIAL

## 2025-08-08 NOTE — TELEPHONE ENCOUNTER
Called patient to get information from her and patient stated she could not talk, she needed to get some sleep.